# Patient Record
Sex: FEMALE | Race: WHITE | Employment: FULL TIME | URBAN - METROPOLITAN AREA
[De-identification: names, ages, dates, MRNs, and addresses within clinical notes are randomized per-mention and may not be internally consistent; named-entity substitution may affect disease eponyms.]

---

## 2017-01-03 NOTE — TELEPHONE ENCOUNTER
----- Message from Lisandra Dasilva sent at 1/3/2017  2:27 PM CST -----  Contact: self  Needs refill on prenatal prescription.  Please call back at       [id number] [place] - [address] AT [facility] [address]  [address] [place] [address]  Phone: [phone] Fax: [phone]

## 2017-01-10 ENCOUNTER — PATIENT MESSAGE (OUTPATIENT)
Dept: OBSTETRICS AND GYNECOLOGY | Facility: CLINIC | Age: 27
End: 2017-01-10

## 2017-01-17 ENCOUNTER — ROUTINE PRENATAL (OUTPATIENT)
Dept: OBSTETRICS AND GYNECOLOGY | Facility: CLINIC | Age: 27
End: 2017-01-17
Payer: COMMERCIAL

## 2017-01-17 VITALS
BODY MASS INDEX: 40.61 KG/M2 | DIASTOLIC BLOOD PRESSURE: 70 MMHG | WEIGHT: 247.81 LBS | SYSTOLIC BLOOD PRESSURE: 100 MMHG

## 2017-01-17 DIAGNOSIS — Z3A.30 30 WEEKS GESTATION OF PREGNANCY: Primary | ICD-10-CM

## 2017-01-17 DIAGNOSIS — A69.20 LYME DISEASE: ICD-10-CM

## 2017-01-17 LAB
BILIRUB SERPL-MCNC: NORMAL MG/DL
BLOOD URINE, POC: NORMAL
COLOR, POC UA: NORMAL
GLUCOSE UR QL STRIP: NORMAL
KETONES UR QL STRIP: NORMAL
LEUKOCYTE ESTERASE URINE, POC: NORMAL
NITRITE, POC UA: NORMAL
PH, POC UA: 7
PROTEIN, POC: NORMAL
SPECIFIC GRAVITY, POC UA: NORMAL
UROBILINOGEN, POC UA: NORMAL

## 2017-01-17 PROCEDURE — 99999 PR PBB SHADOW E&M-EST. PATIENT-LVL III: CPT | Mod: PBBFAC,,, | Performed by: OBSTETRICS & GYNECOLOGY

## 2017-01-17 PROCEDURE — 0502F SUBSEQUENT PRENATAL CARE: CPT | Mod: S$GLB,,, | Performed by: OBSTETRICS & GYNECOLOGY

## 2017-01-17 NOTE — MR AVS SNAPSHOT
[facility] - OB/GYN  [address] [place] [address]  Phone: [phone]                  Arlette Capps   2017 4:20 PM   Routine Prenatal    Description:  Female : 1990   Provider:  Jack Ramos MD   Department:  [facility] - OB/GYN           Reason for Visit     Routine Prenatal Visit     sharp pains in abdomen     swelling in groin           Diagnoses this Visit        Comments    30 weeks gestation of pregnancy    -  Primary            To Do List           Future Appointments        Provider Department Dept Phone    2017 4:00 PM [facility] US1 [facility] - Ultrasound [phone]      Goals (5 Years of Data)     None      [facility]     [facility] -  Assistance  Registered nurses in the [facility] provide clinical advisement, health education, appointment booking, and other advisory services.  Call for this free service at [phone].             Medications           Message regarding Medications     Verify the changes and/or additions to your medication regime listed below are the same as discussed with your clinician today.  If any of these changes or additions are incorrect, please notify your healthcare provider.             Verify that the below list of medications is an accurate representation of the medications you are currently taking.  If none reported, the list may be blank. If incorrect, please contact your healthcare provider. Carry this list with you in case of emergency.           Current Medications     -iron-FA-om-3s-dha-epa 3.33 mg iron- 0.33 mg Chew Take 1 tablet by mouth once daily.    ranitidine (ZANTAC) 150 MG tablet Take 1 tablet (150 mg total) by mouth 2 (two) times daily.           Clinical Reference Information           Prenatal Vitals     Enc. Date GA Prenatal Vitals Prenatal Pulse Pain Level Urine Albumin/Glucose Edema Presentation Dilation/Effacement/Station    17 30w3d 100/70 / 112.4  "kg (247 lb 12.8 oz) 30 cm / 145 / Present  7 Negative / Negative       12/28/16 27w4d 102/60 / 108.5 kg (239 lb 3.2 oz) 29 cm / 144 / Present 81 0 Negative / Negative       12/5/16 24w2d 124/72 / 107.5 kg (236 lb 15.9 oz) 26 cm / 136 / Present  5 Negative       12/1/16 23w5d 132/58 (A) / 107 kg (236 lb)  80         11/2/16 19w4d 126/76 / 104.6 kg (230 lb 9.6 oz) 23 cm / 158 / Present  2        10/20/16 17w5d 109/60 / 103 kg (227 lb)  94         9/29/16 14w5d 118/62 / 101.2 kg (223 lb 1.7 oz) 15 cm / 156  0 Negative / Negative       9/1/16 10w5d 100/70 / 101.2 kg (223 lb 1.7 oz)  / 152  0 Negative / Negative       8/11/16 7w5d  / 102.5 kg (226 lb)              Height: 5' 5.5" (1.664 m)       Vital Signs - Last Recorded  Most recent update: 1/17/2017  4:27 PM by Renata Valdez LPN    BP Wt LMP BMI       100/70 112.4 kg (247 lb 12.8 oz) 06/18/2016 40.61 kg/m2       Allergies as of 1/17/2017     Latex, Natural Rubber    Protonix [Pantoprazole]      Immunizations Administered on Date of Encounter - 1/17/2017     None      Orders Placed During Today's Visit      Normal Orders This Visit    POCT URINE DIPSTICK WITHOUT MICROSCOPE          1/17/2017  4:22 PM - eRnata Valdez LPN      Component Results     Component    Color, UA    Spec Grav, UA    pH, UA    7    WBC, UA    neg    Nitrite, UA    neg    Protein, UA    neg    Glucose, UA    neg    Ketones, UA    trace    Urobilinogen, UA    neg    Bilirubin, UA    neg    Blood, UA    neg      "

## 2017-01-18 ENCOUNTER — PATIENT MESSAGE (OUTPATIENT)
Dept: OBSTETRICS AND GYNECOLOGY | Facility: CLINIC | Age: 27
End: 2017-01-18

## 2017-01-18 NOTE — PROGRESS NOTES
Complaints today: none, good fetal movmement    Visit Vitals    /70    Wt 112.4 kg (247 lb 12.8 oz)    LMP 2016    BMI 40.61 kg/m2       26 y.o., at 30w4d by Estimated Date of Delivery: 3/25/17  Patient Active Problem List   Diagnosis    Left knee pain    Bilateral hand numbness    Chronic fatigue fibromyalgia syndrome    Lyme disease, unspecified    Obesity complicating pregnancy in second trimester     OB History    Para Term  AB SAB TAB Ectopic Multiple Living   1               # Outcome Date GA Lbr Jeffry/2nd Weight Sex Delivery Anes PTL Lv   1 Current                   Dating reviewed    Allergies and problem list reviewed and updated    Medical and surgical history reviewed    Prenatal labs reviewed and updated    Physical Exam:  ABD: soft, gravid, nontender,     Assessment:  30 weeks, doing well    Plan:   U/s for groath   follow up 2 Weeks, kick counts, labor precautions

## 2017-01-25 ENCOUNTER — PATIENT MESSAGE (OUTPATIENT)
Dept: OBSTETRICS AND GYNECOLOGY | Facility: CLINIC | Age: 27
End: 2017-01-25

## 2017-01-26 ENCOUNTER — ROUTINE PRENATAL (OUTPATIENT)
Dept: OBSTETRICS AND GYNECOLOGY | Facility: CLINIC | Age: 27
End: 2017-01-26
Payer: COMMERCIAL

## 2017-01-26 VITALS — BODY MASS INDEX: 41.4 KG/M2 | DIASTOLIC BLOOD PRESSURE: 76 MMHG | SYSTOLIC BLOOD PRESSURE: 124 MMHG | WEIGHT: 252.63 LBS

## 2017-01-26 DIAGNOSIS — Z3A.31 31 WEEKS GESTATION OF PREGNANCY: Primary | ICD-10-CM

## 2017-01-26 LAB
BILIRUB SERPL-MCNC: NORMAL MG/DL
BLOOD URINE, POC: NORMAL
COLOR, POC UA: NORMAL
GLUCOSE UR QL STRIP: NORMAL
KETONES UR QL STRIP: NORMAL
LEUKOCYTE ESTERASE URINE, POC: NORMAL
NITRITE, POC UA: NORMAL
PH, POC UA: 5
PROTEIN, POC: NORMAL
SPECIFIC GRAVITY, POC UA: NORMAL
UROBILINOGEN, POC UA: NORMAL

## 2017-01-26 PROCEDURE — 0502F SUBSEQUENT PRENATAL CARE: CPT | Mod: S$GLB,,, | Performed by: OBSTETRICS & GYNECOLOGY

## 2017-01-26 PROCEDURE — 1159F MED LIST DOCD IN RCRD: CPT | Mod: S$GLB,,, | Performed by: OBSTETRICS & GYNECOLOGY

## 2017-01-26 PROCEDURE — 99999 PR PBB SHADOW E&M-EST. PATIENT-LVL II: CPT | Mod: PBBFAC,,, | Performed by: OBSTETRICS & GYNECOLOGY

## 2017-01-26 NOTE — PROGRESS NOTES
Complaints today: watery vaginal d/c without bleeding x 3 weeks on and off    Visit Vitals    /76    Wt 114.6 kg (252 lb 10.4 oz)    LMP 2016    BMI 41.4 kg/m2       26 y.o., at 31w5d by Estimated Date of Delivery: 3/25/17  Patient Active Problem List   Diagnosis    Left knee pain    Bilateral hand numbness    Chronic fatigue fibromyalgia syndrome    Lyme disease, unspecified    Obesity complicating pregnancy in second trimester     OB History    Para Term  AB SAB TAB Ectopic Multiple Living   1               # Outcome Date GA Lbr Jeffry/2nd Weight Sex Delivery Anes PTL Lv   1 Current                   Dating reviewed    Allergies and problem list reviewed and updated    Medical and surgical history reviewed    Prenatal labs reviewed and updated    Physical Exam:  ABD: soft, gravid, nontender  SSE: neg pool and NTZ    Assessment:  31 weeks, doing well    Plan:   R/o ROM today   follow up 1 Weeks, kick counts, labor precautions, as scheduled.

## 2017-01-26 NOTE — MR AVS SNAPSHOT
[facility]  [address] [place] [address]  Phone: [phone]                  Arlette Capps   2017 11:40 AM   Routine Prenatal    Description:  Female : 1990   Provider:  Jack Ramos MD   Department:  [facility] - OB/GYN           Reason for Visit     Routine Prenatal Visit     c/o leaking fluid           Diagnoses this Visit        Comments    31 weeks gestation of pregnancy    -  Primary            To Do List           Future Appointments        Provider Department Dept Phone    2017 11:40 AM MD STEVIE Redd [facility] [phone]    2017 3:20 PM Jack Ramos MD [facility] [phone]    2017 4:00 PM [facility] US1 [facility] - Ultrasound [phone]      Goals (5 Years of Data)     None      [facility]     [facility] On Call Nurse Care Line -  Assistance  Registered nurses in the [facility] On Call Center provide clinical advisement, health education, appointment booking, and other advisory services.  Call for this free service at [phone].             Medications           Message regarding Medications     Verify the changes and/or additions to your medication regime listed below are the same as discussed with your clinician today.  If any of these changes or additions are incorrect, please notify your healthcare provider.             Verify that the below list of medications is an accurate representation of the medications you are currently taking.  If none reported, the list may be blank. If incorrect, please contact your healthcare provider. Carry this list with you in case of emergency.           Current Medications     -iron-FA-om-3s-dha-epa 3.33 mg iron- 0.33 mg Chew Take 1 tablet by mouth once daily.    ranitidine (ZANTAC) 150 MG tablet Take 1 tablet (150 mg total) by mouth 2 (two) times daily.           Clinical Reference Information           Prenatal Vitals     Enc. Date GA Prenatal Vitals  "Prenatal Pulse Pain Level Urine Albumin/Glucose Edema Presentation Dilation/Effacement/Station    1/26/17 31w5d 124/76 / 114.6 kg (252 lb 10.4 oz) 32 cm / 149 / Present  7 Negative / Negative       1/17/17 30w3d 100/70 / 112.4 kg (247 lb 12.8 oz) 30 cm / 145 / Present  7 Negative / Negative       12/28/16 27w4d 102/60 / 108.5 kg (239 lb 3.2 oz) 29 cm / 144 / Present 81 0 Negative / Negative       12/5/16 24w2d 124/72 / 107.5 kg (236 lb 15.9 oz) 26 cm / 136 / Present  5 Negative       12/1/16 23w5d 132/58 (A) / 107 kg (236 lb)  80         11/2/16 19w4d 126/76 / 104.6 kg (230 lb 9.6 oz) 23 cm / 158 / Present  2        10/20/16 17w5d 109/60 / 103 kg (227 lb)  94         9/29/16 14w5d 118/62 / 101.2 kg (223 lb 1.7 oz) 15 cm / 156  0 Negative / Negative       9/1/16 10w5d 100/70 / 101.2 kg (223 lb 1.7 oz)  / 152  0 Negative / Negative       8/11/16 7w5d  / 102.5 kg (226 lb)              Height: 5' 5.5" (1.664 m)       Vital Signs - Last Recorded  Most recent update: 1/26/2017 10:57 AM by Rachelle Santiago LPN    BP Wt LMP BMI       124/76 114.6 kg (252 lb 10.4 oz) 06/18/2016 41.4 kg/m2       Allergies as of 1/26/2017     Latex, Natural Rubber    Protonix [Pantoprazole]      Immunizations Administered on Date of Encounter - 1/26/2017     None      Orders Placed During Today's Visit      Normal Orders This Visit    POCT URINE DIPSTICK WITHOUT MICROSCOPE          1/26/2017 10:44 AM - Renata Valdez LPN      Component Results     Component    Color, UA    Spec Grav, UA    pH, UA    5    WBC, UA    neg    Nitrite, UA    neg    Protein, UA    neg    Glucose, UA    neg    Ketones, UA    neg    Urobilinogen, UA    neg    Bilirubin, UA    neg    Blood, UA    neg      "

## 2017-02-01 ENCOUNTER — ROUTINE PRENATAL (OUTPATIENT)
Dept: OBSTETRICS AND GYNECOLOGY | Facility: CLINIC | Age: 27
End: 2017-02-01
Payer: COMMERCIAL

## 2017-02-01 ENCOUNTER — HOSPITAL ENCOUNTER (OUTPATIENT)
Dept: RADIOLOGY | Facility: CLINIC | Age: 27
Discharge: HOME | End: 2017-02-01
Attending: OBSTETRICS & GYNECOLOGY
Payer: COMMERCIAL

## 2017-02-01 VITALS
DIASTOLIC BLOOD PRESSURE: 74 MMHG | SYSTOLIC BLOOD PRESSURE: 124 MMHG | WEIGHT: 253.06 LBS | BODY MASS INDEX: 41.48 KG/M2

## 2017-02-01 DIAGNOSIS — A69.20 LYME DISEASE: ICD-10-CM

## 2017-02-01 DIAGNOSIS — Z3A.32 32 WEEKS GESTATION OF PREGNANCY: Primary | ICD-10-CM

## 2017-02-01 DIAGNOSIS — Z3A.27 27 WEEKS GESTATION OF PREGNANCY: ICD-10-CM

## 2017-02-01 PROCEDURE — 76805 OB US >/= 14 WKS SNGL FETUS: CPT | Mod: 26,,, | Performed by: RADIOLOGY

## 2017-02-01 PROCEDURE — 0502F SUBSEQUENT PRENATAL CARE: CPT | Mod: S$GLB,,, | Performed by: OBSTETRICS & GYNECOLOGY

## 2017-02-01 PROCEDURE — 76805 OB US >/= 14 WKS SNGL FETUS: CPT | Mod: TC,PO

## 2017-02-01 PROCEDURE — 99999 PR PBB SHADOW E&M-EST. PATIENT-LVL II: CPT | Mod: PBBFAC,,, | Performed by: OBSTETRICS & GYNECOLOGY

## 2017-02-01 NOTE — MR AVS SNAPSHOT
[facility] - OB/GYN  [address] [place] [address]  Phone: [phone]                  Arlette Capps   2017 3:20 PM   Routine Prenatal    Description:  Female : 1990   Provider:  Jack Ramos MD   Department:  [facility] - OB/GYN           Reason for Visit     Routine Prenatal Visit                To Do List           Future Appointments        Provider Department Dept Phone    2017  4:00 PM [facility] US1 [facility] - Ultrasound [phone]      Goals (5 Years of Data)     None      [facility]     [facility] Care [facility] -  Assistance  Registered nurses in the [facility] provide clinical advisement, health education, appointment booking, and other advisory services.  Call for this free service at [phone].             Medications           Message regarding Medications     Verify the changes and/or additions to your medication regime listed below are the same as discussed with your clinician today.  If any of these changes or additions are incorrect, please notify your healthcare provider.             Verify that the below list of medications is an accurate representation of the medications you are currently taking.  If none reported, the list may be blank. If incorrect, please contact your healthcare provider. Carry this list with you in case of emergency.           Current Medications     -iron-FA-om-3s-dha-epa 3.33 mg iron- 0.33 mg Chew Take 1 tablet by mouth once daily.    ranitidine (ZANTAC) 150 MG tablet Take 1 tablet (150 mg total) by mouth 2 (two) times daily.           Clinical Reference Information           Prenatal Vitals     Enc. Date GA Prenatal Vitals Prenatal Pulse Pain Level Urine Albumin/Glucose Edema Presentation Dilation/Effacement/Station    17 32w4d 124/74 / 114.8 kg (253 lb 1.4 oz) 35 cm / 142 / Present  2        17 31w5d 124/76 / 114.6 kg (252 lb 10.4 oz) 32 cm / 149 / Present  7 Negative /  "Negative       17 30w3d 100/70 / 112.4 kg (247 lb 12.8 oz) 30 cm / 145 / Present  7 Negative / Negative       16 27w4d 102/60 / 108.5 kg (239 lb 3.2 oz) 29 cm / 144 / Present 81 0 Negative / Negative       16 24w2d 124/72 / 107.5 kg (236 lb 15.9 oz) 26 cm / 136 / Present  5 Negative       16 23w5d 132/58 (A) / 107 kg (236 lb)  80         16 19w4d 126/76 / 104.6 kg (230 lb 9.6 oz) 23 cm / 158 / Present  2        10/20/16 17w5d 109/60 / 103 kg (227 lb)  94         16 14w5d 118/62 / 101.2 kg (223 lb 1.7 oz) 15 cm / 156  0 Negative / Negative       16 10w5d 100/70 / 101.2 kg (223 lb 1.7 oz)  / 152  0 Negative / Negative       16 7w5d  / 102.5 kg (226 lb)              TW.8 kg (28 lb 3.5 oz)   Pregravid weight: 102 kg (224 lb 13.9 oz)   Height: 5' 5.5" (1.664 m)   BMI: 36.8       Vital Signs - Last Recorded  Most recent update: 2017  3:42 PM by Rachelle Santiago LPN    BP Wt LMP BMI       124/74 114.8 kg (253 lb 1.4 oz) 2016 41.48 kg/m2       Allergies as of 2017     Latex, Natural Rubber    Protonix [Pantoprazole]      Immunizations Administered on Date of Encounter - 2017     None      "

## 2017-02-01 NOTE — PROGRESS NOTES
Complaints today: none    Visit Vitals    /74    Wt 114.8 kg (253 lb 1.4 oz)    LMP 2016    BMI 41.48 kg/m2       26 y.o., at 32w4d by Estimated Date of Delivery: 3/25/17  Patient Active Problem List   Diagnosis    Left knee pain    Bilateral hand numbness    Chronic fatigue fibromyalgia syndrome    Lyme disease, unspecified    Obesity complicating pregnancy in second trimester     OB History    Para Term  AB SAB TAB Ectopic Multiple Living   1               # Outcome Date GA Lbr Jeffry/2nd Weight Sex Delivery Anes PTL Lv   1 Current                   Dating reviewed    Allergies and problem list reviewed and updated    Medical and surgical history reviewed    Prenatal labs reviewed and updated    Physical Exam:  ABD: soft, gravid, nontender    Assessment:  32 weeks, doing well    Plan:    follow up 2 Weeks, kick counts, labor precautions   U/s today for growth

## 2017-02-14 ENCOUNTER — ROUTINE PRENATAL (OUTPATIENT)
Dept: OBSTETRICS AND GYNECOLOGY | Facility: CLINIC | Age: 27
End: 2017-02-14
Payer: COMMERCIAL

## 2017-02-14 VITALS
WEIGHT: 259.69 LBS | DIASTOLIC BLOOD PRESSURE: 70 MMHG | BODY MASS INDEX: 42.56 KG/M2 | SYSTOLIC BLOOD PRESSURE: 119 MMHG

## 2017-02-14 DIAGNOSIS — O09.93 HIGH-RISK PREGNANCY, THIRD TRIMESTER: ICD-10-CM

## 2017-02-14 DIAGNOSIS — Z3A.34 34 WEEKS GESTATION OF PREGNANCY: Primary | ICD-10-CM

## 2017-02-14 LAB
BILIRUB SERPL-MCNC: NORMAL MG/DL
BLOOD URINE, POC: NORMAL
COLOR, POC UA: NORMAL
GLUCOSE UR QL STRIP: NORMAL
KETONES UR QL STRIP: NORMAL
LEUKOCYTE ESTERASE URINE, POC: NORMAL
NITRITE, POC UA: NORMAL
PH, POC UA: 6
PROTEIN, POC: NORMAL
SPECIFIC GRAVITY, POC UA: NORMAL
UROBILINOGEN, POC UA: NORMAL

## 2017-02-14 PROCEDURE — 0502F SUBSEQUENT PRENATAL CARE: CPT | Mod: S$GLB,,, | Performed by: OBSTETRICS & GYNECOLOGY

## 2017-02-14 PROCEDURE — 99999 PR PBB SHADOW E&M-EST. PATIENT-LVL II: CPT | Mod: PBBFAC,,, | Performed by: OBSTETRICS & GYNECOLOGY

## 2017-02-14 NOTE — PROGRESS NOTES
Complaints today: none    Visit Vitals    /70    Wt 117.8 kg (259 lb 11.2 oz)    LMP 2016    BMI 42.56 kg/m2       26 y.o., at 34w3d by Estimated Date of Delivery: 3/25/17  Patient Active Problem List   Diagnosis    Left knee pain    Bilateral hand numbness    Chronic fatigue fibromyalgia syndrome    Lyme disease, unspecified    Obesity complicating pregnancy in second trimester     OB History    Para Term  AB SAB TAB Ectopic Multiple Living   1               # Outcome Date GA Lbr Jeffry/2nd Weight Sex Delivery Anes PTL Lv   1 Current                   Dating reviewed    Allergies and problem list reviewed and updated    Medical and surgical history reviewed    Prenatal labs reviewed and updated    Physical Exam:  ABD: soft, gravid, nontender    Assessment:  34 weeks, doing well    Plan:    follow up 1 Weeks, kick counts, labor precautions    gbs at follow up , S>D

## 2017-02-14 NOTE — MR AVS SNAPSHOT
[facility] - OB/GYN  [address] [place] [address]  Phone: [phone]                  Arlette Capps   2017 3:00 PM   Routine Prenatal    Description:  Female : 1990   Provider:  Jack Ramos MD   Department:  [facility] - OB/GYN           Reason for Visit     Routine Prenatal Visit           Diagnoses this Visit        Comments    34 weeks gestation of pregnancy    -  Primary            To Do List           Goals (5 Years of Data)     None      [facility]     [facility] Care [facility] -  Assistance  Registered nurses in the [facility] On Call Center provide clinical advisement, health education, appointment booking, and other advisory services.  Call for this free service at [phone].             Medications           Message regarding Medications     Verify the changes and/or additions to your medication regime listed below are the same as discussed with your clinician today.  If any of these changes or additions are incorrect, please notify your healthcare provider.             Verify that the below list of medications is an accurate representation of the medications you are currently taking.  If none reported, the list may be blank. If incorrect, please contact your healthcare provider. Carry this list with you in case of emergency.           Current Medications     -iron-FA-om-3s-dha-epa 3.33 mg iron- 0.33 mg Chew Take 1 tablet by mouth once daily.    ranitidine (ZANTAC) 150 MG tablet Take 1 tablet (150 mg total) by mouth 2 (two) times daily.           Clinical Reference Information           Prenatal Vitals     Enc. Date GA Prenatal Vitals Prenatal Pulse Pain Level Urine Albumin/Glucose Edema Presentation Dilation/Effacement/Station    17 34w3d 119/70 / 117.8 kg (259 lb 11.2 oz) 34 cm / 178 / Present  4 Negative / 1+       17 32w4d 124/74 / 114.8 kg (253 lb 1.4 oz) 32 cm / 142 / Present  2        17 31w5d 124/76 / 114.6 kg  "(252 lb 10.4 oz) 32 cm / 149 / Present  7 Negative / Negative       1/17/17 30w3d 100/70 / 112.4 kg (247 lb 12.8 oz) 30 cm / 145 / Present  7 Negative / Negative       12/28/16 27w4d 102/60 / 108.5 kg (239 lb 3.2 oz) 29 cm / 144 / Present 81 0 Negative / Negative       12/5/16 24w2d 124/72 / 107.5 kg (236 lb 15.9 oz) 26 cm / 136 / Present  5 Negative       12/1/16 23w5d 132/58 (A) / 107 kg (236 lb)  80         11/2/16 19w4d 126/76 / 104.6 kg (230 lb 9.6 oz) 23 cm / 158 / Present  2        10/20/16 17w5d 109/60 / 103 kg (227 lb)  94         9/29/16 14w5d 118/62 / 101.2 kg (223 lb 1.7 oz) 15 cm / 156  0 Negative / Negative       9/1/16 10w5d 100/70 / 101.2 kg (223 lb 1.7 oz)  / 152  0 Negative / Negative       8/11/16 7w5d  / 102.5 kg (226 lb)              TWG: 15.8 kg (34 lb 13.3 oz)   Pregravid weight: 102 kg (224 lb 13.9 oz)   Height: 5' 5.5" (1.664 m)   BMI: 36.8       Your Vitals Were     BP Weight Last Period BMI       119/70 117.8 kg (259 lb 11.2 oz) 06/18/2016 42.56 kg/m2       Allergies as of 2/14/2017     Latex, Natural Rubber    Protonix [Pantoprazole]      Immunizations Administered on Date of Encounter - 2/14/2017     None      Orders Placed During Today's Visit      Normal Orders This Visit    POCT URINE DIPSTICK WITHOUT MICROSCOPE          2/14/2017  3:23 PM - Renaat Valdez LPN      Component Results     Component    Color, UA    Spec Grav, UA    pH, UA    6    WBC, UA    neg    Nitrite, UA    neg    Protein, UA    neg    Glucose, UA    1+    Ketones, UA    trace    Urobilinogen, UA    neg    Bilirubin, UA    neg    Blood, UA    neg            Language Assistance Services     ATTENTION: Language assistance services are available, free of charge. Please call [phone].      ATENCIÓN: Si habla español, tiene a henry disposición servicios gratuitos de asistencia lingüística. Llame al [phone].     CHÚ Ý: N?u b?n nói Ti?ng Vi?t, có các d?ch v? h? tr? ngôn ng? mi?n phí dành cho b?n. G?i s? " [phone].         [facility] complies with applicable Federal civil rights laws and does not discriminate on the basis of race, color, national origin, age, disability, or sex.

## 2017-02-21 ENCOUNTER — ROUTINE PRENATAL (OUTPATIENT)
Dept: OBSTETRICS AND GYNECOLOGY | Facility: CLINIC | Age: 27
End: 2017-02-21
Payer: COMMERCIAL

## 2017-02-21 ENCOUNTER — HOSPITAL ENCOUNTER (OUTPATIENT)
Dept: RADIOLOGY | Facility: CLINIC | Age: 27
Discharge: HOME | End: 2017-02-21
Attending: OBSTETRICS & GYNECOLOGY
Payer: COMMERCIAL

## 2017-02-21 VITALS
WEIGHT: 263.88 LBS | DIASTOLIC BLOOD PRESSURE: 84 MMHG | BODY MASS INDEX: 43.25 KG/M2 | SYSTOLIC BLOOD PRESSURE: 124 MMHG

## 2017-02-21 DIAGNOSIS — Z3A.35 35 WEEKS GESTATION OF PREGNANCY: ICD-10-CM

## 2017-02-21 DIAGNOSIS — Z3A.34 34 WEEKS GESTATION OF PREGNANCY: ICD-10-CM

## 2017-02-21 DIAGNOSIS — Z36.85 SCREENING, ANTENATAL, FOR STREPTOCOCCUS B: Primary | ICD-10-CM

## 2017-02-21 DIAGNOSIS — O09.93 HIGH-RISK PREGNANCY, THIRD TRIMESTER: ICD-10-CM

## 2017-02-21 LAB
BILIRUB SERPL-MCNC: NEGATIVE MG/DL
BLOOD URINE, POC: NEGATIVE
COLOR, POC UA: YELLOW
GLUCOSE UR QL STRIP: NEGATIVE
KETONES UR QL STRIP: NEGATIVE
LEUKOCYTE ESTERASE URINE, POC: NEGATIVE
NITRITE, POC UA: NEGATIVE
PH, POC UA: 5
PROTEIN, POC: NEGATIVE
SPECIFIC GRAVITY, POC UA: NORMAL
UROBILINOGEN, POC UA: NEGATIVE

## 2017-02-21 PROCEDURE — 1160F RVW MEDS BY RX/DR IN RCRD: CPT | Mod: S$GLB,,, | Performed by: OBSTETRICS & GYNECOLOGY

## 2017-02-21 PROCEDURE — 87081 CULTURE SCREEN ONLY: CPT

## 2017-02-21 PROCEDURE — 0502F SUBSEQUENT PRENATAL CARE: CPT | Mod: S$GLB,,, | Performed by: OBSTETRICS & GYNECOLOGY

## 2017-02-21 PROCEDURE — 99999 PR PBB SHADOW E&M-EST. PATIENT-LVL III: CPT | Mod: PBBFAC,,, | Performed by: OBSTETRICS & GYNECOLOGY

## 2017-02-21 PROCEDURE — 76816 OB US FOLLOW-UP PER FETUS: CPT | Mod: 26,,, | Performed by: RADIOLOGY

## 2017-02-21 RX ORDER — CALCIUM CARBONATE 400(1000)
2 TABLET,CHEWABLE ORAL
COMMUNITY
End: 2018-07-17 | Stop reason: ALTCHOICE

## 2017-02-21 NOTE — MR AVS SNAPSHOT
[facility] - [facility]  [address] [place] [address]  Phone: [phone]                  Arlette Capps   2017 3:40 PM   Routine Prenatal    Description:  Female : 1990   Provider:  Jack Ramos MD   Department:  [facility] - OB/GYN           Reason for Visit     Routine Prenatal Visit           Diagnoses this Visit        Comments    Screening, , for Streptococcus B    -  Primary     35 weeks gestation of pregnancy                To Do List           Goals (5 Years of Data)     None      [facility]     [facility] Nurse Care Line -  Assistance  Registered nurses in the [facility] On Call Center provide clinical advisement, health education, appointment booking, and other advisory services.  Call for this free service at [phone].             Medications           Message regarding Medications     Verify the changes and/or additions to your medication regime listed below are the same as discussed with your clinician today.  If any of these changes or additions are incorrect, please notify your healthcare provider.             Verify that the below list of medications is an accurate representation of the medications you are currently taking.  If none reported, the list may be blank. If incorrect, please contact your healthcare provider. Carry this list with you in case of emergency.           Current Medications     calcium carbonate 400 mg (1,000 mg) Chew Take 2 tablets by mouth every 2 (two) hours as needed.    CALCIUM/MAGNESIUM (CALCIUM AND MAGNESIUM ORAL) Take 2 tablets by mouth 3 (three) times a week.    -iron-FA-om-3s-dha-epa 3.33 mg iron- 0.33 mg Chew Take 1 tablet by mouth once daily.    ranitidine (ZANTAC) 150 MG tablet Take 1 tablet (150 mg total) by mouth 2 (two) times daily.           Clinical Reference Information           Prenatal Vitals     Enc. Date GA Prenatal Vitals Prenatal Pulse Pain Level Urine Albumin/Glucose Edema  "Presentation Dilation/Effacement/Station    17 35w3d 124/84 / 119.7 kg (263 lb 14.3 oz) 38 cm / 143 / Present  0 Negative / Negative       17 34w3d 119/70 / 117.8 kg (259 lb 11.2 oz) 37 cm / 178 / Present  4 Negative / 1+       17 32w4d 124/74 / 114.8 kg (253 lb 1.4 oz) 32 cm / 142 / Present  2        17 31w5d 124/76 / 114.6 kg (252 lb 10.4 oz) 32 cm / 149 / Present  7 Negative / Negative       17 30w3d 100/70 / 112.4 kg (247 lb 12.8 oz) 30 cm / 145 / Present  7 Negative / Negative       16 27w4d 102/60 / 108.5 kg (239 lb 3.2 oz) 29 cm / 144 / Present 81 0 Negative / Negative       16 24w2d 124/72 / 107.5 kg (236 lb 15.9 oz) 26 cm / 136 / Present  5 Negative       16 23w5d 132/58 (A) / 107 kg (236 lb)  80         16 19w4d 126/76 / 104.6 kg (230 lb 9.6 oz) 23 cm / 158 / Present  2        10/20/16 17w5d 109/60 / 103 kg (227 lb)  94         16 14w5d 118/62 / 101.2 kg (223 lb 1.7 oz) 15 cm / 156  0 Negative / Negative       16 10w5d 100/70 / 101.2 kg (223 lb 1.7 oz)  / 152  0 Negative / Negative       16 7w5d  / 102.5 kg (226 lb)              TW.7 kg (39 lb 0.3 oz)   Pregravid weight: 102 kg (224 lb 13.9 oz)   Height: 5' 5.5" (1.664 m)   BMI: 36.8       Your Vitals Were     BP Weight Last Period BMI       124/84 119.7 kg (263 lb 14.3 oz) 2016 43.25 kg/m2       Allergies as of 2017     Latex, Natural Rubber    Protonix [Pantoprazole]      Immunizations Administered on Date of Encounter - 2017     None      Orders Placed During Today's Visit      Normal Orders This Visit    POCT URINE DIPSTICK WITHOUT MICROSCOPE     Strep B Screen, Vaginal / Rectal          2017  3:50 PM - Day Lubin LPN      Component Results     Component    Color    Yellow    Spec Grav    pH, UA    5    WBC, UA    Negative    Nitrite    Negative    Protein    Negative    Glucose, UA    Negative    Ketones, UA    Negative    Urobilinogen    Negative    " Bilirubin    Negative    Blood, UA    Negative            Language Assistance Services     ATTENTION: Language assistance services are available, free of charge. Please call [phone].      ATENCIÓN: Si habla diamondañol, tiene a henry disposición servicios gratuitos de asistencia lingüística. Llame al [phone].     CHÚ Ý: N?u b?n nói Ti?ng Vi?t, có các d?ch v? h? tr? ngôn ng? mi?n phí dành cho b?n. G?i s? [phone].         [facility] complies with applicable Federal civil rights laws and does not discriminate on the basis of race, color, national origin, age, disability, or sex.

## 2017-02-23 LAB — BACTERIA SPEC AEROBE CULT: NORMAL

## 2017-03-01 ENCOUNTER — ROUTINE PRENATAL (OUTPATIENT)
Dept: OBSTETRICS AND GYNECOLOGY | Facility: CLINIC | Age: 27
End: 2017-03-01
Payer: COMMERCIAL

## 2017-03-01 VITALS — WEIGHT: 265.63 LBS | BODY MASS INDEX: 43.53 KG/M2 | SYSTOLIC BLOOD PRESSURE: 90 MMHG | DIASTOLIC BLOOD PRESSURE: 60 MMHG

## 2017-03-01 DIAGNOSIS — O99.611 GASTROESOPHAGEAL REFLUX DURING PREGNANCY IN FIRST TRIMESTER, ANTEPARTUM: ICD-10-CM

## 2017-03-01 DIAGNOSIS — Z3A.36 36 WEEKS GESTATION OF PREGNANCY: Primary | ICD-10-CM

## 2017-03-01 DIAGNOSIS — K21.9 GASTROESOPHAGEAL REFLUX DURING PREGNANCY IN FIRST TRIMESTER, ANTEPARTUM: ICD-10-CM

## 2017-03-01 PROCEDURE — 99999 PR PBB SHADOW E&M-EST. PATIENT-LVL II: CPT | Mod: PBBFAC,,, | Performed by: OBSTETRICS & GYNECOLOGY

## 2017-03-01 PROCEDURE — 0502F SUBSEQUENT PRENATAL CARE: CPT | Mod: S$GLB,,, | Performed by: OBSTETRICS & GYNECOLOGY

## 2017-03-01 NOTE — MR AVS SNAPSHOT
[facility] - [facility]  [address] [place] [address]  Phone: [phone]                  Arlette Capps   3/1/2017 4:40 PM   Routine Prenatal    Description:  Female : 1990   Provider:  Jack Ramos MD   Department:  [facility] - OB/GY[facility]           Diagnoses this Visit        Comments    36 weeks gestation of pregnancy    -  Primary            To Do List           Goals (5 Years of Data)     None      [facility]     [facility] -  Assistance  Registered nurses in the [facility] provide clinical advisement, health education, appointment booking, and other advisory services.  Call for this free service at [phone].             Medications           Message regarding Medications     Verify the changes and/or additions to your medication regime listed below are the same as discussed with your clinician today.  If any of these changes or additions are incorrect, please notify your healthcare provider.             Verify that the below list of medications is an accurate representation of the medications you are currently taking.  If none reported, the list may be blank. If incorrect, please contact your healthcare provider. Carry this list with you in case of emergency.           Current Medications     calcium carbonate 400 mg (1,000 mg) Chew Take 2 tablets by mouth every 2 (two) hours as needed.    CALCIUM/MAGNESIUM (CALCIUM AND MAGNESIUM ORAL) Take 2 tablets by mouth 3 (three) times a week.    -iron-FA-om-3s-dha-epa 3.33 mg iron- 0.33 mg Chew Take 1 tablet by mouth once daily.    ranitidine (ZANTAC) 150 MG tablet TAKE 1 TABLET BY MOUTH TWICE DAILY           Clinical Reference Information           Prenatal Vitals     Enc. Date GA Prenatal Vitals Prenatal Pulse Pain Level Urine Albumin/Glucose Edema Presentation Dilation/Effacement/Station    3/1/17 36w4d 90/60 / 120.5 kg (265 lb 10.5 oz) 39 cm / 144 / Present  0 Negative /  "Negative       17 35w3d 124/84 / 119.7 kg (263 lb 14.3 oz) 38 cm / 143 / Present  0 Negative / Negative       17 34w3d 119/70 / 117.8 kg (259 lb 11.2 oz) 37 cm / 178 / Present  4 Negative / 1+       17 32w4d 124/74 / 114.8 kg (253 lb 1.4 oz) 32 cm / 142 / Present  2        17 31w5d 124/76 / 114.6 kg (252 lb 10.4 oz) 32 cm / 149 / Present  7 Negative / Negative       17 30w3d 100/70 / 112.4 kg (247 lb 12.8 oz) 30 cm / 145 / Present  7 Negative / Negative       16 27w4d 102/60 / 108.5 kg (239 lb 3.2 oz) 29 cm / 144 / Present 81 0 Negative / Negative       16 24w2d 124/72 / 107.5 kg (236 lb 15.9 oz) 26 cm / 136 / Present  5 Negative       16 23w5d 132/58 (A) / 107 kg (236 lb)  80         16 19w4d 126/76 / 104.6 kg (230 lb 9.6 oz) 23 cm / 158 / Present  2        10/20/16 17w5d 109/60 / 103 kg (227 lb)  94         16 14w5d 118/62 / 101.2 kg (223 lb 1.7 oz) 15 cm / 156  0 Negative / Negative       16 10w5d 100/70 / 101.2 kg (223 lb 1.7 oz)  / 152  0 Negative / Negative       16 7w5d  / 102.5 kg (226 lb)              TW.5 kg (40 lb 12.6 oz)   Pregravid weight: 102 kg (224 lb 13.9 oz)   Height: 5' 5.5" (1.664 m)   BMI: 36.8       Your Vitals Were     BP                   90/60           Allergies as of 3/1/2017     Latex, Natural Rubber    Protonix [Pantoprazole]      Immunizations Administered on Date of Encounter - 3/1/2017     None      Orders Placed During Today's Visit      Normal Orders This Visit    POCT URINE DIPSTICK WITHOUT MICROSCOPE          3/1/2017  4:32 PM - Renata Valdez LPN      Component Results     Component    Color    Spec Grav    pH, UA    5    WBC, UA    neg    Nitrite    neg    Protein    neg    Glucose, UA    neg    Ketones, UA    neg    Urobilinogen    neg    Bilirubin    neg    Blood, UA    neg            Language Assistance Services     ATTENTION: Language assistance services are available, free of charge. Please call " [phone].      ATENCIÓN: Si habla español, tiene a henry disposición servicios gratuitos de asistencia lingüística. Llame al [phone].     CHÚ Ý: N?u b?n nói Ti?ng Vi?t, có các d?ch v? h? tr? ngôn ng? mi?n phí dành cho b?n. G?i s? [phone].         [facility] complies with applicable Federal civil rights laws and does not discriminate on the basis of race, color, national origin, age, disability, or sex.

## 2017-03-01 NOTE — PROGRESS NOTES
Complaints today: none    BP 90/60  Wt 120.5 kg (265 lb 10.5 oz)  LMP 2016  BMI 43.53 kg/m2    26 y.o., at 36w4d by Estimated Date of Delivery: 3/25/17  Patient Active Problem List   Diagnosis    Left knee pain    Bilateral hand numbness    Chronic fatigue fibromyalgia syndrome    Lyme disease, unspecified    Obesity complicating pregnancy in second trimester     OB History    Para Term  AB SAB TAB Ectopic Multiple Living   1               # Outcome Date GA Lbr Jeffry/2nd Weight Sex Delivery Anes PTL Lv   1 Current                   Dating reviewed    Allergies and problem list reviewed and updated    Medical and surgical history reviewed    Prenatal labs reviewed and updated    Physical Exam:  ABD: soft, gravid, nontender    Assessment:  irreg contractions, counselde  36 weeks, doing well    Plan:    follow up 1 Weeks, kick counts, labor precautions

## 2017-03-06 ENCOUNTER — PATIENT MESSAGE (OUTPATIENT)
Dept: OBSTETRICS AND GYNECOLOGY | Facility: CLINIC | Age: 27
End: 2017-03-06

## 2017-03-07 ENCOUNTER — ROUTINE PRENATAL (OUTPATIENT)
Dept: OBSTETRICS AND GYNECOLOGY | Facility: CLINIC | Age: 27
End: 2017-03-07
Payer: COMMERCIAL

## 2017-03-07 VITALS
WEIGHT: 271.19 LBS | BODY MASS INDEX: 44.44 KG/M2 | SYSTOLIC BLOOD PRESSURE: 124 MMHG | DIASTOLIC BLOOD PRESSURE: 72 MMHG

## 2017-03-07 DIAGNOSIS — Z3A.37 37 WEEKS GESTATION OF PREGNANCY: Primary | ICD-10-CM

## 2017-03-07 LAB
BILIRUB SERPL-MCNC: NORMAL MG/DL
BLOOD URINE, POC: NORMAL
COLOR, POC UA: YELLOW
GLUCOSE UR QL STRIP: NORMAL
KETONES UR QL STRIP: NORMAL
LEUKOCYTE ESTERASE URINE, POC: NORMAL
NITRITE, POC UA: NORMAL
PH, POC UA: 7
PROTEIN, POC: NORMAL
SPECIFIC GRAVITY, POC UA: NORMAL
UROBILINOGEN, POC UA: NORMAL

## 2017-03-07 PROCEDURE — 0502F SUBSEQUENT PRENATAL CARE: CPT | Mod: S$GLB,,, | Performed by: OBSTETRICS & GYNECOLOGY

## 2017-03-07 PROCEDURE — 99999 PR PBB SHADOW E&M-EST. PATIENT-LVL II: CPT | Mod: PBBFAC,,, | Performed by: OBSTETRICS & GYNECOLOGY

## 2017-03-07 NOTE — PROGRESS NOTES
Complaints today: none    /72  Wt 123 kg (271 lb 2.7 oz)  LMP 2016  BMI 44.44 kg/m2    26 y.o., at 37w3d by Estimated Date of Delivery: 3/25/17  Patient Active Problem List   Diagnosis    Left knee pain    Bilateral hand numbness    Chronic fatigue fibromyalgia syndrome    Lyme disease, unspecified    Obesity complicating pregnancy in second trimester     OB History    Para Term  AB SAB TAB Ectopic Multiple Living   1               # Outcome Date GA Lbr Jeffry/2nd Weight Sex Delivery Anes PTL Lv   1 Current                   Dating reviewed    Allergies and problem list reviewed and updated    Medical and surgical history reviewed    Prenatal labs reviewed and updated    Physical Exam:  ABD: soft, gravid, nontender    Assessment:  37 weeks, doing well    Plan:    follow up 1 Weeks, kick counts, labor precautions

## 2017-03-07 NOTE — MR AVS SNAPSHOT
[facility] - OB/GYN  [address] [place] [address]  Phone: [phone]                  Arlette Capps   3/7/2017 2:20 PM   Routine Prenatal    Description:  Female : 1990   Provider:  Jack Ramos MD   Department:  [facility] - OB/GY[facility]           Reason for Visit     Routine Prenatal Visit           Diagnoses this Visit        Comments    37 weeks gestation of pregnancy    -  Primary            To Do List           Goals (5 Years of Data)     None      [facility]     [facility] Care [facility] -  Assistance  Registered nurses in the [facility] provide clinical advisement, health education, appointment booking, and other advisory services.  Call for this free service at [phone].             Medications           Message regarding Medications     Verify the changes and/or additions to your medication regime listed below are the same as discussed with your clinician today.  If any of these changes or additions are incorrect, please notify your healthcare provider.             Verify that the below list of medications is an accurate representation of the medications you are currently taking.  If none reported, the list may be blank. If incorrect, please contact your healthcare provider. Carry this list with you in case of emergency.           Current Medications     -iron-FA-om-3s-dha-epa 3.33 mg iron- 0.33 mg Chew Take 1 tablet by mouth once daily.    ranitidine (ZANTAC) 150 MG tablet TAKE 1 TABLET BY MOUTH TWICE DAILY    calcium carbonate 400 mg (1,000 mg) Chew Take 2 tablets by mouth every 2 (two) hours as needed.    CALCIUM/MAGNESIUM (CALCIUM AND MAGNESIUM ORAL) Take 2 tablets by mouth 3 (three) times a week.           Clinical Reference Information           Prenatal Vitals     Enc. Date GA Prenatal Vitals Prenatal Pulse Pain Level Urine Albumin/Glucose Edema Presentation Dilation/Effacement/Station    3/7/17 37w3d 124/72 / 123 kg (271  "lb 2.7 oz) 39 cm / 143 / Present  2 Negative / Negative       3/1/17 36w4d 90/60 / 120.5 kg (265 lb 10.5 oz) 39 cm / 144 / Present  0 Negative / Negative       17 35w3d 124/84 / 119.7 kg (263 lb 14.3 oz) 38 cm / 143 / Present  0 Negative / Negative       17 34w3d 119/70 / 117.8 kg (259 lb 11.2 oz) 37 cm / 178 / Present  4 Negative / 1+       17 32w4d 124/74 / 114.8 kg (253 lb 1.4 oz) 32 cm / 142 / Present  2        17 31w5d 124/76 / 114.6 kg (252 lb 10.4 oz) 32 cm / 149 / Present  7 Negative / Negative       17 30w3d 100/70 / 112.4 kg (247 lb 12.8 oz) 30 cm / 145 / Present  7 Negative / Negative       16 27w4d 102/60 / 108.5 kg (239 lb 3.2 oz) 29 cm / 144 / Present 81 0 Negative / Negative       16 24w2d 124/72 / 107.5 kg (236 lb 15.9 oz) 26 cm / 136 / Present  5 Negative       16 23w5d 132/58 (A) / 107 kg (236 lb)  80         16 19w4d 126/76 / 104.6 kg (230 lb 9.6 oz) 23 cm / 158 / Present  2        10/20/16 17w5d 109/60 / 103 kg (227 lb)  94         16 14w5d 118/62 / 101.2 kg (223 lb 1.7 oz) 15 cm / 156  0 Negative / Negative       16 10w5d 100/70 / 101.2 kg (223 lb 1.7 oz)  / 152  0 Negative / Negative       16 7w5d  / 102.5 kg (226 lb)              TW kg (46 lb 4.7 oz)   Pregravid weight: 102 kg (224 lb 13.9 oz)   Height: 5' 5.5" (1.664 m)   BMI: 36.8       Your Vitals Were     BP Weight Last Period BMI       124/72 123 kg (271 lb 2.7 oz) 2016 44.44 kg/m2       Allergies as of 3/7/2017     Latex, Natural Rubber    Protonix [Pantoprazole]      Immunizations Administered on Date of Encounter - 3/7/2017     None      Orders Placed During Today's Visit      Normal Orders This Visit    POCT URINE DIPSTICK WITHOUT MICROSCOPE          3/7/2017  2:36 PM - Rachelle Santiago LPN      Component Results     Component    Color    yellow    Spec Grav    pH, UA    7    WBC, UA    trace    Nitrite    neg    Protein    neg    Glucose, UA    neg    Ketones, UA "    neg    Urobilinogen    neg    Bilirubin    neg    Blood, UA    neg            Language Assistance Services     ATTENTION: Language assistance services are available, free of charge. Please call [phone].      ATENCIÓN: Si habla mat, tiene a henry disposición servicios gratuitos de asistencia lingüística. Llame al [phone].     CHÚ Ý: N?u b?n nói Ti?ng Vi?t, có các d?ch v? h? tr? ngôn ng? mi?n phí dành cho b?n. G?i s? [phone].         [facility] - [facility] complies with applicable Federal civil rights laws and does not discriminate on the basis of race, color, national origin, age, disability, or sex.

## 2017-03-14 ENCOUNTER — ROUTINE PRENATAL (OUTPATIENT)
Dept: OBSTETRICS AND GYNECOLOGY | Facility: CLINIC | Age: 27
End: 2017-03-14
Payer: COMMERCIAL

## 2017-03-14 VITALS
BODY MASS INDEX: 44.08 KG/M2 | SYSTOLIC BLOOD PRESSURE: 126 MMHG | WEIGHT: 268.94 LBS | DIASTOLIC BLOOD PRESSURE: 84 MMHG

## 2017-03-14 DIAGNOSIS — Z3A.38 38 WEEKS GESTATION OF PREGNANCY: Primary | ICD-10-CM

## 2017-03-14 LAB
BILIRUB SERPL-MCNC: NORMAL MG/DL
BLOOD URINE, POC: NORMAL
COLOR, POC UA: YELLOW
GLUCOSE UR QL STRIP: NORMAL
KETONES UR QL STRIP: NORMAL
LEUKOCYTE ESTERASE URINE, POC: NORMAL
NITRITE, POC UA: NORMAL
PH, POC UA: 6
PROTEIN, POC: NORMAL
SPECIFIC GRAVITY, POC UA: NORMAL
UROBILINOGEN, POC UA: NORMAL

## 2017-03-14 PROCEDURE — 99999 PR PBB SHADOW E&M-EST. PATIENT-LVL II: CPT | Mod: PBBFAC,,, | Performed by: OBSTETRICS & GYNECOLOGY

## 2017-03-14 PROCEDURE — 0502F SUBSEQUENT PRENATAL CARE: CPT | Mod: ,,, | Performed by: OBSTETRICS & GYNECOLOGY

## 2017-03-14 PROCEDURE — 81002 URINALYSIS NONAUTO W/O SCOPE: CPT | Mod: S$GLB,,, | Performed by: OBSTETRICS & GYNECOLOGY

## 2017-03-14 NOTE — MR AVS SNAPSHOT
[facility] - OB/GYN  [address] [place] [address]  Phone: [phone]                  Arlette Capps   3/14/2017 4:20 PM   Routine Prenatal    Description:  Female : 1990   Provider:  Jack Ramos MD   Department:  [facility] - OB/GY[facility]           Reason for Visit     Routine Prenatal Visit           Diagnoses this Visit        Comments    38 weeks gestation of pregnancy    -  Primary            To Do List           Goals (5 Years of Data)     None      [facility]     [facility] Care [facility] -  Assistance  Registered nurses in the [facility] provide clinical advisement, health education, appointment booking, and other advisory services.  Call for this free service at [phone].             Medications           Message regarding Medications     Verify the changes and/or additions to your medication regime listed below are the same as discussed with your clinician today.  If any of these changes or additions are incorrect, please notify your healthcare provider.             Verify that the below list of medications is an accurate representation of the medications you are currently taking.  If none reported, the list may be blank. If incorrect, please contact your healthcare provider. Carry this list with you in case of emergency.           Current Medications     calcium carbonate 400 mg (1,000 mg) Chew Take 2 tablets by mouth every 2 (two) hours as needed.    CALCIUM/MAGNESIUM (CALCIUM AND MAGNESIUM ORAL) Take 2 tablets by mouth 3 (three) times a week.    -iron-FA-om-3s-dha-epa 3.33 mg iron- 0.33 mg Chew Take 1 tablet by mouth once daily.    ranitidine (ZANTAC) 150 MG tablet TAKE 1 TABLET BY MOUTH TWICE DAILY           Clinical Reference Information           Prenatal Vitals     Enc. Date GA Prenatal Vitals Prenatal Pulse Pain Level Urine Albumin/Glucose Edema Presentation Dilation/Effacement/Station    3/14/17 38w3d 126/84 / 122 kg (268  "lb 15.4 oz)  / 150 / Present  0 Negative / Negative       3/7/17 37w3d 124/72 / 123 kg (271 lb 2.7 oz) 39 cm / 143 / Present  2 Negative / Negative +1 / +1 Vertex 2 / 50 / -3    3/1/17 36w4d 90/60 / 120.5 kg (265 lb 10.5 oz) 39 cm / 144 / Present  0 Negative / Negative       17 35w3d 124/84 / 119.7 kg (263 lb 14.3 oz) 38 cm / 143 / Present  0 Negative / Negative       17 34w3d 119/70 / 117.8 kg (259 lb 11.2 oz) 37 cm / 178 / Present  4 Negative / 1+       17 32w4d 124/74 / 114.8 kg (253 lb 1.4 oz) 32 cm / 142 / Present  2        17 31w5d 124/76 / 114.6 kg (252 lb 10.4 oz) 32 cm / 149 / Present  7 Negative / Negative       17 30w3d 100/70 / 112.4 kg (247 lb 12.8 oz) 30 cm / 145 / Present  7 Negative / Negative       16 27w4d 102/60 / 108.5 kg (239 lb 3.2 oz) 29 cm / 144 / Present 81 0 Negative / Negative       16 24w2d 124/72 / 107.5 kg (236 lb 15.9 oz) 26 cm / 136 / Present  5 Negative       16 23w5d 132/58 (A) / 107 kg (236 lb)  80         16 19w4d 126/76 / 104.6 kg (230 lb 9.6 oz) 23 cm / 158 / Present  2        10/20/16 17w5d 109/60 / 103 kg (227 lb)  94         16 14w5d 118/62 / 101.2 kg (223 lb 1.7 oz) 15 cm / 156  0 Negative / Negative       16 10w5d 100/70 / 101.2 kg (223 lb 1.7 oz)  / 152  0 Negative / Negative       16 7w5d  / 102.5 kg (226 lb)              TW kg (44 lb 1.5 oz)   Pregravid weight: 102 kg (224 lb 13.9 oz)   Height: 5' 5.5" (1.664 m)   BMI: 36.8       Your Vitals Were     BP Weight Last Period BMI       126/84 122 kg (268 lb 15.4 oz) 2016 44.08 kg/m2       Allergies as of 3/14/2017     Latex, Natural Rubber    Protonix [Pantoprazole]      Immunizations Administered on Date of Encounter - 3/14/2017     None      Orders Placed During Today's Visit      Normal Orders This Visit    POCT URINE DIPSTICK WITHOUT MICROSCOPE          3/14/2017  4:34 PM - Rachelle Santiago LPN      Component Results     Component    Color    yellow    " Spec Grav    pH, UA    6    WBC, UA    neg    Nitrite    neg    Protein    neg    Glucose, UA    neg    Ketones, UA    neg    Urobilinogen    neg    Bilirubin    neg    Blood, UA    neg            Language Assistance Services     ATTENTION: Language assistance services are available, free of charge. Please call [phone].      ATENCIÓN: Si habla español, tiene a henry disposición servicios gratuitos de asistencia lingüística. Llame al [phone].     CHÚ Ý: N?u b?n nói Ti?ng Vi?t, có các d?ch v? h? tr? ngôn ng? mi?n phí dành cho b?n. G?i s? [phone].         [facility] - [facility] complies with applicable Federal civil rights laws and does not discriminate on the basis of race, color, national origin, age, disability, or sex.

## 2017-03-14 NOTE — PROGRESS NOTES
Complaints today: none    /84  Wt 122 kg (268 lb 15.4 oz)  LMP 2016  BMI 44.08 kg/m2    26 y.o., at 38w3d by Estimated Date of Delivery: 3/25/17  Patient Active Problem List   Diagnosis    Left knee pain    Bilateral hand numbness    Chronic fatigue fibromyalgia syndrome    Lyme disease, unspecified    Obesity complicating pregnancy in second trimester     OB History    Para Term  AB SAB TAB Ectopic Multiple Living   1               # Outcome Date GA Lbr Jeffry/2nd Weight Sex Delivery Anes PTL Lv   1 Current                   Dating reviewed    Allergies and problem list reviewed and updated    Medical and surgical history reviewed    Prenatal labs reviewed and updated    Physical Exam:  ABD: soft, gravid, nontender    Assessment:  38 weeks, doing well    Plan:    follow up 1 Weeks, kick counts, labor precautions

## 2017-03-15 ENCOUNTER — PATIENT MESSAGE (OUTPATIENT)
Dept: OBSTETRICS AND GYNECOLOGY | Facility: CLINIC | Age: 27
End: 2017-03-15

## 2017-03-22 ENCOUNTER — ROUTINE PRENATAL (OUTPATIENT)
Dept: OBSTETRICS AND GYNECOLOGY | Facility: CLINIC | Age: 27
End: 2017-03-22
Payer: COMMERCIAL

## 2017-03-22 ENCOUNTER — PATIENT MESSAGE (OUTPATIENT)
Dept: OBSTETRICS AND GYNECOLOGY | Facility: CLINIC | Age: 27
End: 2017-03-22

## 2017-03-22 VITALS
DIASTOLIC BLOOD PRESSURE: 70 MMHG | SYSTOLIC BLOOD PRESSURE: 124 MMHG | BODY MASS INDEX: 44.15 KG/M2 | WEIGHT: 269.38 LBS

## 2017-03-22 DIAGNOSIS — Z3A.39 39 WEEKS GESTATION OF PREGNANCY: Primary | ICD-10-CM

## 2017-03-22 LAB
BILIRUB SERPL-MCNC: NORMAL MG/DL
BLOOD URINE, POC: 50
COLOR, POC UA: YELLOW
GLUCOSE UR QL STRIP: NORMAL
KETONES UR QL STRIP: NORMAL
LEUKOCYTE ESTERASE URINE, POC: NORMAL
NITRITE, POC UA: NORMAL
PH, POC UA: 5
PROTEIN, POC: NORMAL
SPECIFIC GRAVITY, POC UA: NORMAL
UROBILINOGEN, POC UA: NORMAL

## 2017-03-22 PROCEDURE — 99999 PR PBB SHADOW E&M-EST. PATIENT-LVL II: CPT | Mod: PBBFAC,,, | Performed by: OBSTETRICS & GYNECOLOGY

## 2017-03-22 PROCEDURE — 0502F SUBSEQUENT PRENATAL CARE: CPT | Mod: S$GLB,,, | Performed by: OBSTETRICS & GYNECOLOGY

## 2017-03-22 NOTE — MR AVS SNAPSHOT
[facility]  [address] [place] [address]  Phone: [phone]                  Arlette Capps   3/22/2017 4:20 PM   Routine Prenatal    Description:  Female : 1990   Provider:  Jack Ramos MD   Department:  [facility] - OB/GYN           Reason for Visit     Routine Prenatal Visit           Diagnoses this Visit        Comments    39 weeks gestation of pregnancy    -  Primary            To Do List           Future Appointments        Provider Department Dept Phone    3/22/2017 4:20 PM Jack Ramos MD [phone]      Goals (5 Years of Data)     None      [facility]     [facility] -  Assistance  Registered nurses in the [facility] Call Center provide clinical advisement, health education, appointment booking, and other advisory services.  Call for this free service at [phone].             Medications           Message regarding Medications     Verify the changes and/or additions to your medication regime listed below are the same as discussed with your clinician today.  If any of these changes or additions are incorrect, please notify your healthcare provider.             Verify that the below list of medications is an accurate representation of the medications you are currently taking.  If none reported, the list may be blank. If incorrect, please contact your healthcare provider. Carry this list with you in case of emergency.           Current Medications     calcium carbonate 400 mg (1,000 mg) Chew Take 2 tablets by mouth every 2 (two) hours as needed.    CALCIUM/MAGNESIUM (CALCIUM AND MAGNESIUM ORAL) Take 2 tablets by mouth 3 (three) times a week.    -iron-FA-om-3s-dha-epa 3.33 mg iron- 0.33 mg Chew Take 1 tablet by mouth once daily.    ranitidine (ZANTAC) 150 MG tablet TAKE 1 TABLET BY MOUTH TWICE DAILY           Clinical Reference Information           Prenatal Vitals     Enc. Date GA Prenatal  "Vitals Prenatal Pulse Pain Level Urine Albumin/Glucose Edema Presentation Dilation/Effacement/Station    3/22/17 39w4d 124/70 / 122.2 kg (269 lb 6.4 oz) 40 cm / 134 / Present  2 Negative / Negative       3/14/17 38w3d 126/84 / 122 kg (268 lb 15.4 oz) 39 cm / 150 / Present  0 Negative / Negative  Vertex 2 / 50 / -3    3/7/17 37w3d 124/72 / 123 kg (271 lb 2.7 oz) 39 cm / 143 / Present  2 Negative / Negative +1 / +1 Vertex 2 / 50 / -3    3/1/17 36w4d 90/60 / 120.5 kg (265 lb 10.5 oz) 39 cm / 144 / Present  0 Negative / Negative       17 35w3d 124/84 / 119.7 kg (263 lb 14.3 oz) 38 cm / 143 / Present  0 Negative / Negative       17 34w3d 119/70 / 117.8 kg (259 lb 11.2 oz) 37 cm / 178 / Present  4 Negative / 1+       17 32w4d 124/74 / 114.8 kg (253 lb 1.4 oz) 32 cm / 142 / Present  2        17 31w5d 124/76 / 114.6 kg (252 lb 10.4 oz) 32 cm / 149 / Present  7 Negative / Negative       17 30w3d 100/70 / 112.4 kg (247 lb 12.8 oz) 30 cm / 145 / Present  7 Negative / Negative       16 27w4d 102/60 / 108.5 kg (239 lb 3.2 oz) 29 cm / 144 / Present 81 0 Negative / Negative       16 24w2d 124/72 / 107.5 kg (236 lb 15.9 oz) 26 cm / 136 / Present  5 Negative       16 23w5d 132/58 (A) / 107 kg (236 lb)  80         16 19w4d 126/76 / 104.6 kg (230 lb 9.6 oz) 23 cm / 158 / Present  2        10/20/16 17w5d 109/60 / 103 kg (227 lb)  94         16 14w5d 118/62 / 101.2 kg (223 lb 1.7 oz) 15 cm / 156  0 Negative / Negative       16 10w5d 100/70 / 101.2 kg (223 lb 1.7 oz)  / 152  0 Negative / Negative       16 7w5d  / 102.5 kg (226 lb)              TW.2 kg (44 lb 8.5 oz)   Pregravid weight: 102 kg (224 lb 13.9 oz)   Height: 5' 5.5" (1.664 m)   BMI: 36.8       Your Vitals Were     BP Weight Last Period BMI       124/70 122.2 kg (269 lb 6.4 oz) 2016 44.15 kg/m2       Allergies as of 3/22/2017     Latex, Natural Rubber    Protonix [Pantoprazole]      Immunizations Administered " on Date of Encounter - 3/22/2017     None      Orders Placed During Today's Visit      Normal Orders This Visit    POCT URINE DIPSTICK WITHOUT MICROSCOPE          3/22/2017  9:53 AM - Rachelle Santiago LPN      Component Results     Component    Color    yellow    Spec Grav    pH, UA    5    WBC, UA    neg    Nitrite    neg    Protein    neg    Glucose, UA    neg    Ketones, UA    neg    Urobilinogen    neg    Bilirubin    neg    Blood, UA    50            Language Assistance Services     ATTENTION: Language assistance services are available, free of charge. Please call [phone].      ATENCIÓN: Si habla español, tiene a henry disposición servicios gratuitos de asistencia lingüística. Llame al [phone].     CHÚ Ý: N?u b?n nói Ti?ng Vi?t, có các d?ch v? h? tr? ngôn ng? mi?n phí dành cho b?n. G?i s? [phone].         [facility] - [facility] complies with applicable Federal civil rights laws and does not discriminate on the basis of race, color, national origin, age, disability, or sex.

## 2017-03-22 NOTE — PROGRESS NOTES
Complaints today: contractions last night    /70  Wt 122.2 kg (269 lb 6.4 oz)  LMP 2016  BMI 44.15 kg/m2    26 y.o., at 39w4d by Estimated Date of Delivery: 3/25/17  Patient Active Problem List   Diagnosis    Left knee pain    Bilateral hand numbness    Chronic fatigue fibromyalgia syndrome    Lyme disease, unspecified    Obesity complicating pregnancy in second trimester     OB History    Para Term  AB SAB TAB Ectopic Multiple Living   1               # Outcome Date GA Lbr Jeffry/2nd Weight Sex Delivery Anes PTL Lv   1 Current                   Dating reviewed    Allergies and problem list reviewed and updated    Medical and surgical history reviewed    Prenatal labs reviewed and updated    Physical Exam:  ABD: soft, gravid, nontender    Assessment:  39 weeks, false labbor    Plan:    follow up 1 Weeks, kick counts, labor precautions

## 2017-03-23 PROBLEM — Z37.9 NORMAL LABOR: Status: ACTIVE | Noted: 2017-03-23

## 2017-03-26 ENCOUNTER — PATIENT MESSAGE (OUTPATIENT)
Dept: OBSTETRICS AND GYNECOLOGY | Facility: CLINIC | Age: 27
End: 2017-03-26

## 2017-04-05 DIAGNOSIS — K21.9 GASTROESOPHAGEAL REFLUX DURING PREGNANCY IN FIRST TRIMESTER, ANTEPARTUM: ICD-10-CM

## 2017-04-05 DIAGNOSIS — O99.611 GASTROESOPHAGEAL REFLUX DURING PREGNANCY IN FIRST TRIMESTER, ANTEPARTUM: ICD-10-CM

## 2017-04-06 ENCOUNTER — POSTPARTUM VISIT (OUTPATIENT)
Dept: OBSTETRICS AND GYNECOLOGY | Facility: CLINIC | Age: 27
End: 2017-04-06
Payer: COMMERCIAL

## 2017-04-06 VITALS
WEIGHT: 247.38 LBS | SYSTOLIC BLOOD PRESSURE: 122 MMHG | DIASTOLIC BLOOD PRESSURE: 78 MMHG | BODY MASS INDEX: 41.22 KG/M2 | HEIGHT: 65 IN

## 2017-04-06 PROCEDURE — 0503F POSTPARTUM CARE VISIT: CPT | Mod: S$GLB,,, | Performed by: OBSTETRICS & GYNECOLOGY

## 2017-04-06 PROCEDURE — 99999 PR PBB SHADOW E&M-EST. PATIENT-LVL III: CPT | Mod: PBBFAC,,, | Performed by: OBSTETRICS & GYNECOLOGY

## 2017-04-06 NOTE — PROGRESS NOTES
Patient her for 2 week postpartum exam without complaint. Breast feeding.   She is s/p Spontaneous vaginal Delivery  - girl  She has no mood complaints.   Minimal lochia.   Perineal pain.    Assessment:  2 week interval PP exam, doing well  Healing well    Plan:  Follow up 4 weeks for pelvic and Birth control

## 2017-04-06 NOTE — MR AVS SNAPSHOT
[facility] - OB/GYN  [address] [place] [address]  Phone: [phone]                  Arlette Capps   2017 3:20 PM   Postpartum Visit    Description:  Female : 1990   Provider:  Jack Ramos MD   Department:  [facility] - OB/GYN           Reason for Visit     Postpartum Care                To Do List           Goals (5 Years of Data)     None      [facility]     [facility] Care Line -  Assistance  Unless otherwise directed by your provider, please contact [facility] On[facility], our nurse care line that is available for  assistance.     Registered nurses in the [facility] On Call Center provide: appointment scheduling, clinical advisement, health education, and other advisory services.  Call: [phone] (toll free)               Medications           Message regarding Medications     Verify the changes and/or additions to your medication regime listed below are the same as discussed with your clinician today.  If any of these changes or additions are incorrect, please notify your healthcare provider.             Verify that the below list of medications is an accurate representation of the medications you are currently taking.  If none reported, the list may be blank. If incorrect, please contact your healthcare provider. Carry this list with you in case of emergency.           Current Medications     CALCIUM/MAGNESIUM (CALCIUM AND MAGNESIUM ORAL) Take 2 tablets by mouth 3 (three) times a week.    -iron-FA-om-3s-dha-epa 3.33 mg iron- 0.33 mg Chew Take 1 tablet by mouth once daily.    calcium carbonate 400 mg (1,000 mg) Chew Take 2 tablets by mouth every 2 (two) hours as needed.    oxycodone-acetaminophen (PERCOCET) 5-325 mg per tablet Take 1 tablet by mouth every 4 (four) hours as needed.    ranitidine (ZANTAC) 150 MG tablet TAKE 1 TABLET BY MOUTH TWICE DAILY           Clinical Reference Information           Prenatal Vitals     Enc. Date GA  "Prenatal Vitals Prenatal Pulse Pain Level Urine Albumin/Glucose Edema Presentation Dilation/Effacement/Station    17 39w5d 122/78 / 112.2 kg (247 lb 5.7 oz)           3/23/17 39w5d Admission Dx: Normal labor Dept: [facility]    3/22/17 39w4d 124/70 / 122.2 kg (269 lb 6.4 oz) 40 cm / 134 / Present  2 Negative / Negative  Vertex  / 80 / -3    3/14/17 38w3d 126/84 / 122 kg (268 lb 15.4 oz) 39 cm / 150 / Present  0 Negative / Negative  Vertex  / 50 / -3    3/7/17 37w3d 124/72 / 123 kg (271 lb 2.7 oz) 39 cm / 143 / Present  2 Negative / Negative +1 / +1 Vertex  50 / -3    3/1/17 36w4d 90/60 / 120.5 kg (265 lb 10.5 oz) 39 cm / 144 / Present  0 Negative / Negative       17 35w3d 124/84 / 119.7 kg (263 lb 14.3 oz) 38 cm / 143 / Present  0 Negative / Negative       17 34w3d 119/70 / 117.8 kg (259 lb 11.2 oz) 37 cm / 178 / Present  4 Negative / 1+       17 32w4d 124/74 / 114.8 kg (253 lb 1.4 oz) 32 cm / 142 / Present  2        17 31w5d 124/76 / 114.6 kg (252 lb 10.4 oz) 32 cm / 149 / Present  7 Negative / Negative       17 30w3d 100/70 / 112.4 kg (247 lb 12.8 oz) 30 cm / 145 / Present  7 Negative / Negative       16 27w4d 102/60 / 108.5 kg (239 lb 3.2 oz) 29 cm / 144 / Present 81 0 Negative / Negative       16 24w2d 124/72 / 107.5 kg (236 lb 15.9 oz) 26 cm / 136 / Present  5 Negative       16 23w5d 132/58 (A) / 107 kg (236 lb)  80         16 19w4d 126/76 / 104.6 kg (230 lb 9.6 oz) 23 cm / 158 / Present  2        10/20/16 17w5d 109/60 / 103 kg (227 lb)  94         16 14w5d 118/62 / 101.2 kg (223 lb 1.7 oz) 15 cm / 156  0 Negative / Negative       16 10w5d 100/70 / 101.2 kg (223 lb 1.7 oz)  / 152  0 Negative / Negative       16 7w5d  / 102.5 kg (226 lb)              TW.5 kg (45 lb 2.1 oz)   Pregravid weight: 102 kg (224 lb 13.9 oz)   Number of babies: 1   Height: 5' 5" (1.651 m)   BMI: 37.4       Your Vitals Were     BP Height Weight Last Period BMI    " "122/78 5' 5" (1.651 m) 112.2 kg (247 lb 5.7 oz) 06/18/2016 41.16 kg/m2      Allergies as of 4/6/2017     Latex, Natural Rubber    Protonix [Pantoprazole]      Immunizations Administered on Date of Encounter - 4/6/2017     None      Language Assistance Services     ATTENTION: Language assistance services are available, free of charge. Please call [phone].      ATENCIÓN: Si habla mat, tiene a henry disposición servicios gratuitos de asistencia lingüística. Llame al [phone].     VÍCTOR Ý: N?u b?n nói Ti?ng Vi?t, có các d?ch v? h? tr? ngôn ng? mi?n phí dành cho b?n. G?i s? [phone].         [facility] complies with applicable Federal civil rights laws and does not discriminate on the basis of race, color, national origin, age, disability, or sex.        "

## 2017-04-15 ENCOUNTER — PATIENT MESSAGE (OUTPATIENT)
Dept: OBSTETRICS AND GYNECOLOGY | Facility: CLINIC | Age: 27
End: 2017-04-15

## 2017-05-08 ENCOUNTER — POSTPARTUM VISIT (OUTPATIENT)
Dept: OBSTETRICS AND GYNECOLOGY | Facility: CLINIC | Age: 27
End: 2017-05-08
Payer: COMMERCIAL

## 2017-05-08 VITALS
DIASTOLIC BLOOD PRESSURE: 72 MMHG | SYSTOLIC BLOOD PRESSURE: 124 MMHG | BODY MASS INDEX: 41.07 KG/M2 | HEIGHT: 65 IN | WEIGHT: 246.5 LBS

## 2017-05-08 DIAGNOSIS — K64.4 EXTERNAL HEMORRHOID: Primary | ICD-10-CM

## 2017-05-08 PROCEDURE — 0503F POSTPARTUM CARE VISIT: CPT | Mod: S$GLB,,, | Performed by: OBSTETRICS & GYNECOLOGY

## 2017-05-08 PROCEDURE — 99999 PR PBB SHADOW E&M-EST. PATIENT-LVL III: CPT | Mod: PBBFAC,,, | Performed by: OBSTETRICS & GYNECOLOGY

## 2017-05-08 RX ORDER — HYDROCORTISONE 25 MG/G
CREAM TOPICAL 2 TIMES DAILY
Qty: 28 G | Refills: 1 | Status: SHIPPED | OUTPATIENT
Start: 2017-05-08 | End: 2018-05-17 | Stop reason: ALTCHOICE

## 2017-05-08 NOTE — PROGRESS NOTES
History of Present Illness:   Pateint presents today  6 weeks postop, status post Spontaneous vaginal Delivery  - baby GIRL, with complaint of hemorrhoids.      Past medical and surgical history reviewed.   I have reviewed the patient's medical history in detail and updated the computerized patient record.    Physical exam:  Vital Signs: as above, reviewed.  Constitutional: She is oriented to person, place, and time. She appears well-developed and well-nourished. No distress.   HENT:   Head: Normocephalic and atraumatic.   Eyes: Conjunctivae and EOM are normal. No scleral icterus.   Neck: Normal range of motion. Neck supple. No tracheal deviation present.   Cardiovascular: Normal rate.    Pulmonary/Chest: Effort normal. No respiratory distress. She exhibits no tenderness.  Breasts: deferred, breast feeding  Abdominal: Soft. She exhibits no distension and no mass. There is no rebound and no guarding.   Genitourinary:    External rectal exam shows no thrombosed external hemorrhoids.    Pelvic exam was performed with patient supine.   No labial fusion.   There is no rash, lesion or injury on the right labia.   There is no rash, lesion or injury on the left labia.   No bleeding and no signs of injury around the vaginal introitus, healed well, urethra is without lesions and well supported. The cervix is visualized with no discharge, lesions or friability.   No vaginal discharge found.    No significant Cystocele, Enterocele or rectocele, and uterus well supported.   Bimanual exam:   The urethra is normal to palpation and there are no palpable vaginal wall masses.   Uterus is not deviated, not enlarged, not fixed, normal shape and not tender.   Cervix exhibits no motion tenderness.    Right adnexum displays no mass and no tenderness.   Left adnexum displays no mass and no tenderness.  Musculoskeletal: Normal range of motion.   Lymphadenopathy: No inguinal adenopathy present.   Neurological: She is alert and oriented to  person, place, and time. Coordination normal.   Skin: Skin is warm and dry. She is not diaphoretic.   Psychiatric: She has a normal mood and affect.    Assessment:  Normal pp exam, condoms for Birth control     Plan:  Follow up  3 months  PAP and exam at follow up

## 2017-05-08 NOTE — MR AVS SNAPSHOT
[facility]  [address], [address]  [facility] [address]  Phone: [phone]  Fax: [phone]                  Arlette Capps   2017 10:40 AM   Postpartum Visit    Description:  Female : 1990   Provider:  Jack Ramos MD   Department:  [facility]           Reason for Visit     Postpartum Care                To Do List           Goals (5 Years of Data)     None      [facility]     [facility] On Call Nurse Care Line -  Assistance  Unless otherwise directed by your provider, please contact [facility], our nurse care line that is available for  assistance.     Registered nurses in the [facility] Center provide: appointment scheduling, clinical advisement, health education, and other advisory services.  Call: [phone] (toll free)               Medications           Message regarding Medications     Verify the changes and/or additions to your medication regime listed below are the same as discussed with your clinician today.  If any of these changes or additions are incorrect, please notify your healthcare provider.             Verify that the below list of medications is an accurate representation of the medications you are currently taking.  If none reported, the list may be blank. If incorrect, please contact your healthcare provider. Carry this list with you in case of emergency.           Current Medications     -iron-FA-om-3s-dha-epa 3.33 mg iron- 0.33 mg Chew Take 1 tablet by mouth once daily.    calcium carbonate 400 mg (1,000 mg) Chew Take 2 tablets by mouth every 2 (two) hours as needed.    CALCIUM/MAGNESIUM (CALCIUM AND MAGNESIUM ORAL) Take 2 tablets by mouth 3 (three) times a week.    oxycodone-acetaminophen (PERCOCET) 5-325 mg per tablet Take 1 tablet by mouth every 4 (four) hours as needed.    ranitidine (ZANTAC) 150 MG tablet TAKE 1 TABLET BY MOUTH TWICE DAILY           Clinical Reference  "Information           Your Vitals Were     BP Height Weight BMI       124/72 5' 5" (1.651 m) 111.8 kg (246 lb 7.6 oz) 41.02 kg/m2       Allergies as of 5/8/2017     Latex, Natural Rubber    Protonix [Pantoprazole]      Immunizations Administered on Date of Encounter - 5/8/2017     None      Language Assistance Services     ATTENTION: Language assistance services are available, free of charge. Please call [phone].      ATENCIÓN: Si habla español, tiene a henry disposición servicios gratuitos de asistencia lingüística. Llame al [phone].     VÍCTOR Ý: N?u b?n nói Ti?ng Vi?t, có các d?ch v? h? tr? ngôn ng? mi?n phí dành cho b?n. G?i s? [phone].         [facility] complies with applicable Federal civil rights laws and does not discriminate on the basis of race, color, national origin, age, disability, or sex.        "

## 2017-05-30 RX ORDER — PNV 112/IRON/FOLIC/OM3/DHA/EPA 3.33-.33MG
TABLET,CHEWABLE ORAL
Qty: 90 TABLET | Refills: 0 | Status: SHIPPED | OUTPATIENT
Start: 2017-05-30 | End: 2018-05-17 | Stop reason: ALTCHOICE

## 2017-06-26 PROBLEM — Z37.9 NORMAL LABOR: Status: RESOLVED | Noted: 2017-03-23 | Resolved: 2017-06-26

## 2018-05-17 ENCOUNTER — TELEPHONE (OUTPATIENT)
Dept: FAMILY MEDICINE | Facility: CLINIC | Age: 28
End: 2018-05-17

## 2018-05-17 ENCOUNTER — OFFICE VISIT (OUTPATIENT)
Dept: FAMILY MEDICINE | Facility: CLINIC | Age: 28
End: 2018-05-17
Payer: COMMERCIAL

## 2018-05-17 VITALS
HEART RATE: 87 BPM | SYSTOLIC BLOOD PRESSURE: 103 MMHG | BODY MASS INDEX: 37.82 KG/M2 | HEIGHT: 65 IN | TEMPERATURE: 98 F | WEIGHT: 227 LBS | DIASTOLIC BLOOD PRESSURE: 67 MMHG

## 2018-05-17 DIAGNOSIS — K21.9 GASTROESOPHAGEAL REFLUX DISEASE WITHOUT ESOPHAGITIS: ICD-10-CM

## 2018-05-17 DIAGNOSIS — M79.7 CHRONIC FATIGUE FIBROMYALGIA SYNDROME: ICD-10-CM

## 2018-05-17 DIAGNOSIS — G47.00 INSOMNIA, UNSPECIFIED TYPE: ICD-10-CM

## 2018-05-17 DIAGNOSIS — B00.1 COLD SORE: ICD-10-CM

## 2018-05-17 DIAGNOSIS — G93.32 CHRONIC FATIGUE FIBROMYALGIA SYNDROME: ICD-10-CM

## 2018-05-17 DIAGNOSIS — M79.7 FIBROMYALGIA: ICD-10-CM

## 2018-05-17 DIAGNOSIS — G43.009 MIGRAINE WITHOUT AURA AND WITHOUT STATUS MIGRAINOSUS, NOT INTRACTABLE: ICD-10-CM

## 2018-05-17 DIAGNOSIS — A69.20 LYME DISEASE, UNSPECIFIED: ICD-10-CM

## 2018-05-17 DIAGNOSIS — Z00.00 ANNUAL PHYSICAL EXAM: Primary | ICD-10-CM

## 2018-05-17 PROCEDURE — 99385 PREV VISIT NEW AGE 18-39: CPT | Mod: S$GLB,,, | Performed by: NURSE PRACTITIONER

## 2018-05-17 PROCEDURE — 99999 PR PBB SHADOW E&M-EST. PATIENT-LVL III: CPT | Mod: PBBFAC,,, | Performed by: NURSE PRACTITIONER

## 2018-05-17 RX ORDER — CELECOXIB 200 MG/1
200 CAPSULE ORAL DAILY
Qty: 30 CAPSULE | Refills: 3 | Status: SHIPPED | OUTPATIENT
Start: 2018-05-17 | End: 2018-07-17 | Stop reason: ALTCHOICE

## 2018-05-17 RX ORDER — SUMATRIPTAN 50 MG/1
50 TABLET, FILM COATED ORAL DAILY PRN
Qty: 9 TABLET | Refills: 3 | Status: SHIPPED | OUTPATIENT
Start: 2018-05-17 | End: 2019-02-19 | Stop reason: SDUPTHER

## 2018-05-17 RX ORDER — CYCLOBENZAPRINE HCL 10 MG
10 TABLET ORAL NIGHTLY
Qty: 30 TABLET | Refills: 3 | Status: SHIPPED | OUTPATIENT
Start: 2018-05-17 | End: 2018-07-17 | Stop reason: SDUPTHER

## 2018-05-17 RX ORDER — CELECOXIB 200 MG/1
CAPSULE ORAL
Qty: 90 CAPSULE | Refills: 3 | OUTPATIENT
Start: 2018-05-17

## 2018-05-17 RX ORDER — VALACYCLOVIR HYDROCHLORIDE 1 G/1
1000 TABLET, FILM COATED ORAL 2 TIMES DAILY
Qty: 30 TABLET | Refills: 1 | Status: SHIPPED | OUTPATIENT
Start: 2018-05-17

## 2018-05-17 NOTE — PROGRESS NOTES
Subjective:       Patient ID: Arlette White is a 27 y.o. female.    Chief Complaint: Annual Exam    HPI   Chief Complaint  Chief Complaint   Patient presents with    Annual Exam       HPI  Arlette White is a 27 y.o. female with medical diagnoses as listed in the medical history and problem list that presents for an annual exam.  Patient was seen in the clinic previously on 8/15/16 for a school physical.  BMI today is 37.77 and pateint has gained 1 pound since last visit. Patient is regularly treated for fibromyalgia.  She has a history of positive Lyme disease serology while living in [place].     PAST MEDICAL HISTORY:  Past Medical History:   Diagnosis Date    Allergy     Brain injuries 1990    surgery for blood clot from fall    Fibromyalgia 2013    Nerve damage     hands and arms    Positive Lyme disease serology 07/2016    under care of Dr. RICHARD Angulo in [place]       PAST SURGICAL HISTORY:  History reviewed. No pertinent surgical history.    SOCIAL HISTORY:  Social History     Social History    Marital status:      Spouse name: N/A    Number of children: N/A    Years of education: N/A     Occupational History    Not on file.     Social History Main Topics    Smoking status: Former Smoker     Packs/day: 1.00     Years: 7.00     Types: Cigarettes     Quit date: 12/2/2014    Smokeless tobacco: Never Used      Comment: pt says she is considering a new stop smoking progam at work    Alcohol use 0.6 oz/week     1 Cans of beer per week    Drug use: No    Sexual activity: Yes     Partners: Male     Birth control/ protection: None     Other Topics Concern    Not on file     Social History Narrative    No narrative on file       FAMILY HISTORY:  Family History   Problem Relation Age of Onset    Depression Mother     Kidney disease Mother     Heart disease Mother     Diabetes Father     Heart disease Father     Depression Father     Hyperlipidemia Father     Alcohol abuse  Sister     Drug abuse Sister     Depression Sister     Mental illness Sister     Alcohol abuse Brother     Drug abuse Brother     Depression Brother     Mental illness Brother     Cancer Maternal Grandmother     Heart disease Maternal Grandfather     Kidney disease Maternal Grandfather     Cancer Paternal Grandmother     Diabetes Paternal Grandfather        ALLERGIES AND MEDICATIONS: updated and reviewed.  Review of patient's allergies indicates:   Allergen Reactions    Latex, natural rubber Rash    Protonix [pantoprazole] Swelling     Current Outpatient Prescriptions   Medication Sig Dispense Refill    calcium carbonate 400 mg (1,000 mg) Chew Take 2 tablets by mouth every 2 (two) hours as needed.      CALCIUM/MAGNESIUM (CALCIUM AND MAGNESIUM ORAL) Take 2 tablets by mouth 3 (three) times a week.      ranitidine (ZANTAC) 150 MG tablet TAKE 1 TABLET BY MOUTH TWICE DAILY 60 tablet 0    celecoxib (CELEBREX) 200 MG capsule Take 1 capsule (200 mg total) by mouth once daily. Anti-inflammatory for fibromyalgia 30 capsule 3    cyclobenzaprine (FLEXERIL) 10 MG tablet Take 1 tablet (10 mg total) by mouth every evening. Muscle relaxant for fibromyalgia 30 tablet 3    sumatriptan (IMITREX) 50 MG tablet Take 1 tablet (50 mg total) by mouth daily as needed for Migraine. Migraine, May repeat in 2 hours prn, no more than 2 tabs in 24 hours 9 tablet 3    valACYclovir (VALTREX) 1000 MG tablet Take 1 tablet (1,000 mg total) by mouth 2 (two) times daily. As needed cold sore 30 tablet 1     No current facility-administered medications for this visit.    I have reviewed the patient's medical history in detail and updated the computerized patient record.    Review of Systems   Constitutional: Positive for fatigue. Negative for activity change, appetite change, chills, fever and unexpected weight change.        Fatigued during the day, states has chronic fatigue syndrome. Exercises 3 days per week at gym.    HENT:  "Positive for congestion. Negative for ear pain, hearing loss, rhinorrhea and trouble swallowing.         Nasal congestion due to sinus allergies   Eyes: Negative for discharge and visual disturbance.   Respiratory: Negative for cough, chest tightness, shortness of breath and wheezing.    Cardiovascular: Positive for chest pain. Negative for palpitations.        Every so often feels chest clenching, accompanied by air hunger, pain is in center of chest, mid-sternal, no radiation, not related to activity   Gastrointestinal: Negative for abdominal pain, blood in stool, constipation, diarrhea, nausea and vomiting.        No symptoms of heartburn or GERD with zantac   Endocrine: Positive for polydipsia. Negative for polyuria.        Wakes at night to drink water   Genitourinary: Positive for menstrual problem. Negative for difficulty urinating, dysuria and hematuria.        States recent periods are heavier, a lot of cramping before and during period, LMP 5/10/18   Musculoskeletal: Positive for arthralgias, joint swelling and neck pain.        Multiple joint pains due to fibromyalgia and lyme disease   Skin: Negative for rash and wound.   Neurological: Positive for weakness and headaches. Negative for dizziness and numbness.        Migraine headaches 2 times per week, Accompanied by nausea, photophobia, and phonophobia, room spinning, pain shoots into the eye.  Has tried tylenol and ibuprofen without effect.    Psychiatric/Behavioral: Positive for confusion and sleep disturbance. Negative for dysphoric mood. The patient is not nervous/anxious.         Mental fogginess due to fibromyalgia, does not sleep well at night, difficulty falling and staying asleep.        Objective:       Vitals:    05/17/18 0733   BP: 103/67   BP Location: Right arm   Patient Position: Sitting   BP Method: X-Large (Automatic)   Pulse: 87   Temp: 98.2 °F (36.8 °C)   TempSrc: Oral   Weight: 103 kg (227 lb)   Height: 5' 5" (1.651 m)     Physical " Exam   Constitutional: She is oriented to person, place, and time. Vital signs are normal. She appears well-developed. No distress.   HENT:   Head: Normocephalic and atraumatic.   Right Ear: Tympanic membrane, external ear and ear canal normal.   Left Ear: Tympanic membrane, external ear and ear canal normal.   Nose: Nose normal.   Mouth/Throat: Oropharynx is clear and moist and mucous membranes are normal. No oropharyngeal exudate.   Eyes: Conjunctivae and lids are normal. Pupils are equal, round, and reactive to light. Right eye exhibits no discharge. Left eye exhibits no discharge. Right conjunctiva is not injected. Left conjunctiva is not injected.   Neck: Normal range of motion. Neck supple. Normal carotid pulses present. Carotid bruit is not present. No thyromegaly present.   Cardiovascular: Normal rate, regular rhythm, S1 normal, S2 normal, normal heart sounds, intact distal pulses and normal pulses.  Exam reveals no gallop and no friction rub.    No murmur heard.  Pulses:       Carotid pulses are 2+ on the right side, and 2+ on the left side.       Radial pulses are 2+ on the right side, and 2+ on the left side.        Posterior tibial pulses are 2+ on the right side, and 2+ on the left side.   Pulmonary/Chest: Effort normal and breath sounds normal. No respiratory distress. She has no decreased breath sounds. She has no wheezes. She has no rhonchi. She has no rales.   Abdominal: Soft. Normal appearance and bowel sounds are normal. She exhibits no distension, no abdominal bruit, no pulsatile midline mass and no mass. There is no hepatosplenomegaly. There is no tenderness. No hernia.   Musculoskeletal: Normal range of motion. She exhibits no edema, tenderness or deformity.   Lymphadenopathy:        Head (right side): No submental, no submandibular, no tonsillar, no preauricular, no posterior auricular and no occipital adenopathy present.        Head (left side): No submental, no submandibular, no tonsillar,  no preauricular, no posterior auricular and no occipital adenopathy present.     She has no cervical adenopathy.   Neurological: She is alert and oriented to person, place, and time. She has normal strength.   Skin: Skin is warm, dry and intact. Capillary refill takes less than 2 seconds. No rash noted. She is not diaphoretic. No pallor.   Psychiatric: She has a normal mood and affect. Her speech is normal and behavior is normal. Judgment and thought content normal. Her mood appears not anxious. Cognition and memory are normal. She does not exhibit a depressed mood.   Nursing note and vitals reviewed.      Assessment:       1. Annual physical exam    2. Chronic fatigue fibromyalgia syndrome    3. Lyme disease, unspecified    4. Fibromyalgia    5. Insomnia, unspecified type    6. Gastroesophageal reflux disease without esophagitis    7. Migraine without aura and without status migrainosus, not intractable    8. Cold sore    9. BMI 37.0-37.9, adult        Plan:   Arlette was seen today for annual exam.    Diagnoses and all orders for this visit:    Annual physical exam   Discussed healthy diet, regular exercise, necessary labs, age appropriate cancer screening, and routine vaccinations.     Educational handouts provided. Prevention guidelines women age 18-39  Chronic fatigue fibromyalgia syndrome  -     celecoxib (CELEBREX) 200 MG capsule; Take 1 capsule (200 mg total) by mouth once daily. Anti-inflammatory for fibromyalgia    Lyme disease, unspecified   Continue follow up with specialist in [place]  Fibromyalgia  -     celecoxib (CELEBREX) 200 MG capsule; Take 1 capsule (200 mg total) by mouth once daily. Anti-inflammatory for fibromyalgia, new medication  -     cyclobenzaprine (FLEXERIL) 10 MG tablet; Take 1 tablet (10 mg total) by mouth every evening. Muscle relaxant for fibromyalgia, new medication    Insomnia, unspecified type  -     cyclobenzaprine (FLEXERIL) 10 MG tablet; Take 1 tablet (10 mg total) by mouth  every evening. Muscle relaxant for fibromyalgia    Gastroesophageal reflux disease without esophagitis   Continue zantac 150 mg po BID, effective for symptoms    Migraine without aura and without status migrainosus, not intractable  -     sumatriptan (IMITREX) 50 MG tablet; Take 1 tablet (50 mg total) by mouth daily as needed for Migraine. Migraine, May repeat in 2 hours prn, no more than 2 tabs in 24 hours, new medication  - If continues to have migraines more than 4 per month may be candidate for prevention; propranolol, topamax and Botox discussed    Cold sore  -     valACYclovir (VALTREX) 1000 MG tablet; Take 1 tablet (1,000 mg total) by mouth 2 (two) times daily. As needed cold sore    BMI 37.0-37.9, adult   BMI 37.77   Patient has gained 1 pound since visit 8/15/16   Patient reports exercises 3 days per week at gym   Weight loss recommended with well balanced diet and portion controlled meals and continued regular exercise/activity.     Follow-up in about 3 months (around 8/17/2018) for follow up.     Patient readiness: acceptance and barriers:none    During the course of the visit the patient was educated and counseled about the following:     Obesity:   General weight loss/lifestyle modification strategies discussed (elicit support from others; identify saboteurs; non-food rewards, etc).  Informal exercise measures discussed, e.g. taking stairs instead of elevator.  Regular aerobic exercise program discussed.    Goals: Obesity: Reduce calorie intake and BMI    Did patient meet goals/outcomes: No    The following self management tools provided: declined    Patient Instructions (the written plan) was given to the patient/family.     Time spent with patient: 45 minutes    Barriers to medications present (no )    Adverse reactions to current medications (no)    Over the counter medications reviewed (Yes)

## 2018-05-17 NOTE — PATIENT INSTRUCTIONS
Prevention Guidelines, Women Ages 18 to 39  Screening tests and vaccines are an important part of managing your health. Health counseling is essential, too. Below are guidelines for these, for women ages 18 to 39. Talk with your healthcare provider to make sure youre up-to-date on what you need.  Screening Who needs it How often   Alcohol misuse All women in this age group At routine exams   Blood pressure All women in this age group Every 2 years if your blood pressure is less than 120/80 mm Hg; yearly if your systolic blood pressure is 120 to 139 mm Hg, or your diastolic blood pressure reading is 80 to 89 mm Hg   Breast cancer All women in this age group should talk with their healthcare providers about the need for clinical breast exams (CBE)1 Clinical breast exam every 3 years1   Cervical cancer Women ages 21 and older Women between ages 21 and 29 should have a Pap test every 3 years; women between ages 30 and 65 are advised to have a Pap test plus an HPV test every 5 years   Chlamydia Sexually active women ages 24 and younger, and women at increased risk for infection Every 3 years if you're at risk or have symptoms   Depression All women in this age group At routine exams   Diabetes mellitus, type 2 Adults with no symptoms who are overweight or obese and have 1 or more other risk factors for diabetes At least every 3 years   Gonorrhea Sexually active women at increased risk for infection At routine exams   Hepatitis C Anyone at increased risk At routine exams   HIV All women At routine exams   Obesity All women in this age group At routine exams   Syphilis Women at increased risk for infection - talk with your healthcare provider At routine exams   Tuberculosis Women at increased risk for infection - talk with your healthcare provider Ask your healthcare provider   Vision All women in this age group At least 1 complete exam in your 20s, and 2 in your 30s   Vaccine2 Who needs it How often   Chickenpox  (varicella) All women in this age group who have no record of this infection or vaccine 2 doses; the second dose should be given 4 to 8 weeks after the first dose   Hepatitis A Women at increased risk for infection - talk with your healthcare provider 2 doses given at least 6 months apart   Hepatitis B Women at increased risk for infection - talk with your healthcare provider 3 doses over 6 months; second dose should be given 1 month after the first dose; the third dose should be given at least 2 months after the second dose and at least 4 months after the first dose   Haemophilus influenzae Type B (HIB) Women at increased risk for infection - talk with your healthcare provider 1 to 3 doses   Human papillomavirus (HPV) All women in this age group up to age 26 3 doses; the second dose should be given 1 to 2 months after the first dose and the third dose given 6 months after the first dose   Influenza (flu) All women in this age group Once a year   Measles, mumps, rubella (MMR) All women in this age group who have no record of these infections or vaccines 1 or 2 doses   Meningococcal Women at increased risk for infection - talk with your healthcare provider 1 or more doses   Pneumococcal conjugate vaccine (PCV13) and pneumococcal polysaccharide vaccine (PPSV23) Women at increased risk for infection - talk with your healthcare provider PCV13: 1 dose ages 19 to 65 (protects against 13 types of pneumococcal bacteria)  PPSV23: 1 to 2 doses through age 64, or 1 dose at 65 or older (protects against 23 types of pneumococcal bacteria)      Tetanus/diphtheria/pertussis (Td/Tdap) booster All women in this age group Td every 10 years, or a one-time dose of Tdap instead of a Td booster after age 18, then Td every 10 years   Counseling Who needs it How often   BRCA gene mutation testing for breast and ovarian cancer susceptibility Women with increased risk for having gene mutation When your risk is known   Breast cancer and  chemoprevention Women at high risk for breast cancer When your risk is known   Diet and exercise Women who are overweight or obese When diagnosed, and then at routine exams   Domestic violence Women at the age in which they are able to have children At routine exams   Sexually transmitted infection prevention Women who are sexually active At routine exams   Skin cancer Prevention of skin cancer in fair-skinned adults through age 24 At routine exams   Use of tobacco and the health effects it can cause All women in this age group Every visit   1According to the ACS, women ages 20 to 39 years should have a clinical breast exam (CBE) as part of their routine health exam every 3 years. Breast self-exams are an option for women starting in their 20s. But the  USPSTF does not recommend CBE.  2Those who are 18 years old and not up-to-date on their childhood vaccines should get all appropriate catch-up vaccines recommended by the CDC.  Date Last Reviewed: 8/27/2015  © 5911-9761 [employer]. [address] [place] [address], [place] [address]. All rights reserved. This information is not intended as a substitute for professional medical care. Always follow your healthcare professional's instructions.

## 2018-07-17 ENCOUNTER — OFFICE VISIT (OUTPATIENT)
Dept: FAMILY MEDICINE | Facility: CLINIC | Age: 28
End: 2018-07-17
Payer: COMMERCIAL

## 2018-07-17 ENCOUNTER — PATIENT MESSAGE (OUTPATIENT)
Dept: FAMILY MEDICINE | Facility: CLINIC | Age: 28
End: 2018-07-17

## 2018-07-17 VITALS
TEMPERATURE: 99 F | HEIGHT: 65 IN | SYSTOLIC BLOOD PRESSURE: 114 MMHG | WEIGHT: 220 LBS | HEART RATE: 89 BPM | BODY MASS INDEX: 36.65 KG/M2 | DIASTOLIC BLOOD PRESSURE: 71 MMHG

## 2018-07-17 DIAGNOSIS — M79.7 CHRONIC FATIGUE FIBROMYALGIA SYNDROME: ICD-10-CM

## 2018-07-17 DIAGNOSIS — G93.32 CHRONIC FATIGUE FIBROMYALGIA SYNDROME: ICD-10-CM

## 2018-07-17 DIAGNOSIS — M25.50 ARTHRALGIA, UNSPECIFIED JOINT: ICD-10-CM

## 2018-07-17 DIAGNOSIS — M79.10 MYALGIA: ICD-10-CM

## 2018-07-17 DIAGNOSIS — A69.20 LYME DISEASE, UNSPECIFIED: ICD-10-CM

## 2018-07-17 DIAGNOSIS — M79.7 FIBROMYALGIA: Primary | ICD-10-CM

## 2018-07-17 DIAGNOSIS — G47.00 INSOMNIA, UNSPECIFIED TYPE: ICD-10-CM

## 2018-07-17 DIAGNOSIS — F34.1 DYSTHYMIA: ICD-10-CM

## 2018-07-17 PROCEDURE — 96372 THER/PROPH/DIAG INJ SC/IM: CPT | Mod: S$GLB,,, | Performed by: NURSE PRACTITIONER

## 2018-07-17 PROCEDURE — 99214 OFFICE O/P EST MOD 30 MIN: CPT | Mod: 25,S$GLB,, | Performed by: NURSE PRACTITIONER

## 2018-07-17 PROCEDURE — 99999 PR PBB SHADOW E&M-EST. PATIENT-LVL IV: CPT | Mod: PBBFAC,,, | Performed by: NURSE PRACTITIONER

## 2018-07-17 PROCEDURE — 3008F BODY MASS INDEX DOCD: CPT | Mod: CPTII,S$GLB,, | Performed by: NURSE PRACTITIONER

## 2018-07-17 RX ORDER — KETOROLAC TROMETHAMINE 30 MG/ML
30 INJECTION, SOLUTION INTRAMUSCULAR; INTRAVENOUS
Status: COMPLETED | OUTPATIENT
Start: 2018-07-17 | End: 2018-07-17

## 2018-07-17 RX ORDER — TRAMADOL HYDROCHLORIDE 50 MG/1
50 TABLET ORAL EVERY 8 HOURS PRN
Qty: 20 TABLET | Refills: 0 | Status: SHIPPED | OUTPATIENT
Start: 2018-07-17

## 2018-07-17 RX ORDER — CYCLOBENZAPRINE HCL 10 MG
10 TABLET ORAL NIGHTLY
Qty: 30 TABLET | Refills: 3 | Status: SHIPPED | OUTPATIENT
Start: 2018-07-17 | End: 2019-02-19 | Stop reason: SDUPTHER

## 2018-07-17 RX ORDER — ERGOCALCIFEROL 1.25 MG/1
CAPSULE ORAL
Refills: 4 | COMMUNITY
Start: 2018-05-31

## 2018-07-17 RX ORDER — ESCITALOPRAM OXALATE 20 MG/1
20 TABLET ORAL DAILY
Qty: 30 TABLET | Refills: 2 | Status: SHIPPED | OUTPATIENT
Start: 2018-07-17 | End: 2018-08-26 | Stop reason: SDUPTHER

## 2018-07-17 RX ORDER — CELECOXIB 200 MG/1
200 CAPSULE ORAL DAILY
Qty: 30 CAPSULE | Refills: 3
Start: 2018-07-17

## 2018-07-17 RX ORDER — ZOLPIDEM TARTRATE 10 MG/1
10 TABLET ORAL NIGHTLY
Refills: 2 | COMMUNITY
Start: 2018-07-09

## 2018-07-17 RX ADMIN — KETOROLAC TROMETHAMINE 30 MG: 30 INJECTION, SOLUTION INTRAMUSCULAR; INTRAVENOUS at 11:07

## 2018-07-17 NOTE — PROGRESS NOTES
Pt. Identified using  and name.Procedure was explained, pt. Verbalized understanding. Ketorolac 30mg  administered IM in the right upper outer gluteus  using sterile technique, no bleeding noted at injection site.  Pt. tolerated procedure well.

## 2018-07-17 NOTE — PROGRESS NOTES
Subjective:       Patient ID: Arlette White is a 27 y.o. female.    Chief Complaint: Pain (Joint, Back, Neck, and Feet )    HPI   Chief Complaint  Chief Complaint   Patient presents with    Pain     Joint, Back, Neck, and Feet        HPI  Arlette White is a 27 y.o. female with medical diagnoses as listed in the medical history and problem list that presents with c/o pain to multiple joints, back, neck, feet related to fibromyalgia. At last visit celebrex was started for pain. States was taking the medication and it was helping her pain, but she was gaining weight on the medication and she stopped the medication 5 days ago and is now having increased pain. Patient is also renovating a house and is doing more physical work than usual.  Pain is constant and she rates the pain an '8' on 0-10 scale. Patient has a history of positive lyme disease serology and follows up with a specialist in [place].  Patient reports that lyme disease specialist has started her on a T3 compounded medication for thyroid dysfunction and a progesterone compounded medication that she is currently taking. BMI today is 36.61 and patient has lost 7 pounds since last visit.  Established patient with last clinic appointment 5/17/2018.        PAST MEDICAL HISTORY:  Past Medical History:   Diagnosis Date    Allergy     Brain injuries 1990    surgery for blood clot from fall    Fibromyalgia 2013    Nerve damage     hands and arms    Positive Lyme disease serology 07/2016    under care of Dr. RICHARD Angulo in [place]       PAST SURGICAL HISTORY:  History reviewed. No pertinent surgical history.    SOCIAL HISTORY:  Social History     Social History    Marital status:      Spouse name: N/A    Number of children: N/A    Years of education: N/A     Occupational History    Not on file.     Social History Main Topics    Smoking status: Former Smoker     Packs/day: 1.00     Years: 7.00     Types: Cigarettes     Quit date:  12/2/2014    Smokeless tobacco: Never Used      Comment: pt says she is considering a new stop smoking progam at work    Alcohol use 0.6 oz/week     1 Cans of beer per week    Drug use: No    Sexual activity: Yes     Partners: Male     Birth control/ protection: None     Other Topics Concern    Not on file     Social History Narrative    No narrative on file       FAMILY HISTORY:  Family History   Problem Relation Age of Onset    Depression Mother     Kidney disease Mother     Heart disease Mother     Diabetes Father     Heart disease Father     Depression Father     Hyperlipidemia Father     Alcohol abuse Sister     Drug abuse Sister     Depression Sister     Mental illness Sister     Alcohol abuse Brother     Drug abuse Brother     Depression Brother     Mental illness Brother     Cancer Maternal Grandmother     Heart disease Maternal Grandfather     Kidney disease Maternal Grandfather     Cancer Paternal Grandmother     Diabetes Paternal Grandfather        ALLERGIES AND MEDICATIONS: updated and reviewed.  Review of patient's allergies indicates:   Allergen Reactions    Latex, natural rubber Rash    Protonix [pantoprazole] Swelling     Current Outpatient Prescriptions   Medication Sig Dispense Refill    CALCIUM/MAGNESIUM (CALCIUM AND MAGNESIUM ORAL) Take 2 tablets by mouth 3 (three) times a week.      cyclobenzaprine (FLEXERIL) 10 MG tablet Take 1 tablet (10 mg total) by mouth every evening. Muscle relaxant for fibromyalgia 30 tablet 3    ergocalciferol (ERGOCALCIFEROL) 50,000 unit Cap TK ONE C PO TWICE A WEEK  4    ranitidine (ZANTAC) 150 MG tablet TAKE 1 TABLET BY MOUTH TWICE DAILY 60 tablet 0    sumatriptan (IMITREX) 50 MG tablet Take 1 tablet (50 mg total) by mouth daily as needed for Migraine. Migraine, May repeat in 2 hours prn, no more than 2 tabs in 24 hours 9 tablet 3    UNABLE TO FIND T3 compounded medication      UNABLE TO FIND Compounded progesterone       "valACYclovir (VALTREX) 1000 MG tablet Take 1 tablet (1,000 mg total) by mouth 2 (two) times daily. As needed cold sore 30 tablet 1    zolpidem (AMBIEN) 10 mg Tab Take 10 mg by mouth every evening.  2    celecoxib (CELEBREX) 200 MG capsule Take 1 capsule (200 mg total) by mouth once daily. Anti-inflammatory for fibromyalgia 30 capsule 3    escitalopram oxalate (LEXAPRO) 20 MG tablet Take 1 tablet (20 mg total) by mouth once daily. 30 tablet 2    traMADol (ULTRAM) 50 mg tablet Take 1 tablet (50 mg total) by mouth every 8 (eight) hours as needed for Pain (not relieved by celebrex). 20 tablet 0     No current facility-administered medications for this visit.        Review of Systems   Constitutional: Negative for activity change, appetite change, chills, fatigue and fever.   Respiratory: Positive for cough. Negative for shortness of breath.         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   Gastrointestinal: Positive for nausea. Negative for abdominal pain, constipation, diarrhea and vomiting.        Some nausea in the evening   Genitourinary: Negative for difficulty urinating and dysuria.   Musculoskeletal: Positive for arthralgias, back pain and neck pain.        C/O joint pain all over body, neck, back, feet, knees, ankles.    Skin: Negative for rash and wound.   Neurological: Negative for dizziness, numbness and headaches.   Hematological: Does not bruise/bleed easily.   Psychiatric/Behavioral: Negative for dysphoric mood and sleep disturbance. The patient is not nervous/anxious.         Sleeps well with flexeril and ambien       Objective:       Vitals:    07/17/18 1024   BP: 114/71   BP Location: Right arm   Patient Position: Sitting   BP Method: Large (Automatic)   Pulse: 89   Temp: 98.9 °F (37.2 °C)   TempSrc: Oral   Weight: 99.8 kg (220 lb)   Height: 5' 5" (1.651 m)     Physical Exam   Constitutional: She is oriented to person, place, and " time. Vital signs are normal. She appears well-developed. No distress.   HENT:   Head: Normocephalic and atraumatic.   Eyes: Conjunctivae and lids are normal. Right eye exhibits no discharge. Left eye exhibits no discharge. Right conjunctiva is not injected. Left conjunctiva is not injected.   Neck: Normal carotid pulses present. Carotid bruit is not present.   Cardiovascular: Normal rate, regular rhythm, S1 normal, S2 normal, normal heart sounds, intact distal pulses and normal pulses.    No murmur heard.  Pulses:       Carotid pulses are 2+ on the right side, and 2+ on the left side.       Radial pulses are 2+ on the right side, and 2+ on the left side.        Posterior tibial pulses are 2+ on the right side, and 2+ on the left side.   No edema   Pulmonary/Chest: Effort normal and breath sounds normal. No respiratory distress. She has no decreased breath sounds. She has no wheezes. She has no rhonchi. She has no rales.   Musculoskeletal: Normal range of motion.   Patient able to move about in exam room without difficulty, no evidence of pain observed.   Neurological: She is alert and oriented to person, place, and time. She has normal strength.   Skin: Skin is warm, dry and intact. She is not diaphoretic. No pallor.   Psychiatric: She has a normal mood and affect. Her speech is normal and behavior is normal. Judgment and thought content normal. Cognition and memory are normal.   Nursing note and vitals reviewed.      Assessment:       1. Fibromyalgia    2. Chronic fatigue fibromyalgia syndrome    3. Lyme disease, unspecified    4. Myalgia    5. Arthralgia, unspecified joint, multiple joints    6. Insomnia, unspecified type    7. BMI 36.0-36.9,adult        Plan:   Arlette was seen today for pain.  Patient reports on questioning that she had increased the celebrex from 1 capsule daily to 2 capsules daily, it was only prescribed for 200 mg QD.  Side effects of celebrex from [facility] reviewed with patient and both  edema and weight gain were reported  <2%. I have recommended that patient resume the celebrex at one capsule daily, not two. She agrees to retry the celebrex at the prescribed dose.   Discussed use of cymbalta and savella with patient and she states she has tried both medications without effect.  Discussed use of lyrica, but as this does have a side effect of weight gain, will not try at this time.  Patient asked for exycodone and hydrocodone for pain, advised that these opioids are not recommended for the treatment of fibromyalgia and that I will not prescribe them.  I did agree to provide a one time prescription of tramadol 50 mg #20 with no refills to treat current increase in pain due to increased physical work with home renovation. Patient will also receive an injection of toradol 30 mg IM in office today.  Diagnoses and all orders for this visit:    Fibromyalgia  -     celecoxib (CELEBREX) 200 MG capsule; Take 1 capsule (200 mg total) by mouth once daily. Anti-inflammatory for fibromyalgia  - Educational handouts provided. Celebrex  -     cyclobenzaprine (FLEXERIL) 10 MG tablet; Take 1 tablet (10 mg total) by mouth every evening. Muscle relaxant for fibromyalgia    Chronic fatigue fibromyalgia syndrome  -     celecoxib (CELEBREX) 200 MG capsule; Take 1 capsule (200 mg total) by mouth once daily. Anti-inflammatory for fibromyalgia    Lyme disease, unspecified   Continue follow up with specialist in [place]  Myalgia  -     traMADol (ULTRAM) 50 mg tablet; Take 1 tablet (50 mg total) by mouth every 8 (eight) hours as needed for Pain (not relieved by celebrex).  -     ketorolac injection 30 mg; Inject 1 mL (30 mg total) into the muscle one time.    Arthralgia, unspecified joint, multiple joints  -     traMADol (ULTRAM) 50 mg tablet; Take 1 tablet (50 mg total) by mouth every 8 (eight) hours as needed for Pain (not relieved by celebrex).  queried without inappropriate activity.   -     ketorolac injection 30 mg;  Inject 1 mL (30 mg total) into the muscle one time.    Insomnia, unspecified type  -     cyclobenzaprine (FLEXERIL) 10 MG tablet; Take 1 tablet (10 mg total) by mouth every evening. Muscle relaxant for fibromyalgia  - Patient is also taking ambien 10 mg at  as needed prescribed by physician in [place]    BMI 36.0-36.9,adult   BMI is 36.61 today with a 7 pound weight loss since last visit 5/17/18   Patient is following a ketogenic diet   Maintain heathy diet and exercise/active lifestyle for continued weight loss      Follow-up if symptoms worsen or fail to improve.     Patient readiness: acceptance and barriers:none    During the course of the visit the patient was educated and counseled about the following:     Obesity:   General weight loss/lifestyle modification strategies discussed (elicit support from others; identify saboteurs; non-food rewards, etc).  Informal exercise measures discussed, e.g. taking stairs instead of elevator.  Regular aerobic exercise program discussed.  Continue current weight loss plan, ketogenic diet    Goals: Obesity: Reduce calorie intake and BMI    Did patient meet goals/outcomes: Yes    The following self management tools provided: declined    Patient Instructions (the written plan) was given to the patient/family.     Time spent with patient: 30 minutes    Barriers to medications present (no )    Adverse reactions to current medications (no)    Over the counter medications reviewed (Yes)

## 2018-07-17 NOTE — PATIENT INSTRUCTIONS
Plan to restart celebrex 200 mg at one capsule daily.   Celecoxib capsules  What is this medicine?  CELECOXIB (sell a [employer]) is a non-steroidal anti-inflammatory drug (NSAID). This medicine is used to treat arthritis and ankylosing spondylitis. It may be also used for pain or painful monthly periods.  How should I use this medicine?  Take this medicine by mouth with a full glass of water. Follow the directions on the prescription label. Take it with food if it upsets your stomach or if you take 400 mg at one time. Try to not lie down for at least 10 minutes after you take the medicine. Take the medicine at the same time each day. Do not take more medicine than you are told to take. Long-term, continuous use may increase the risk of heart attack or stroke.  A special MedGuide will be given to you by the pharmacist with each prescription and refill. Be sure to read this information carefully each time.  Talk to your pediatrician regarding the use of this medicine in children. Special care may be needed.  What side effects may I notice from receiving this medicine?  Side effects that you should report to your doctor or health care professional as soon as possible:  · allergic reactions like skin rash, itching or hives, swelling of the face, lips, or tongue  · black or bloody stools, blood in the urine or vomit  · blurred vision  · breathing problems  · chest pain  · nausea, vomiting  · problems with balance, talking, walking  · redness, blistering, peeling or loosening of the skin, including inside the mouth  · unexplained weight gain or swelling  · unusually weak or tired  · yellowing of eyes, skin  Side effects that usually do not require medical attention (report to your doctor or health care professional if they continue or are bothersome):  · constipation or diarrhea  · dizziness  · gas or heartburn  · upset stomach  What may interact with this medicine?  Do not take this medicine with any of the following  medications:  · cidofovir  · methotrexate  · other NSAIDs, medicines for pain and inflammation, like ibuprofen or naproxen  · pemetrexed  This medicine may also interact with the following medications:  · alcohol  · aspirin and aspirin-like drugs  · diuretics  · fluconazole  · lithium  · medicines for high blood pressure  · steroid medicines like prednisone or cortisone  · warfarin  What if I miss a dose?  If you miss a dose, take it as soon as you can. If it is almost time for your next dose, take only that dose. Do not take double or extra doses.  Where should I keep my medicine?  Keep out of the reach of children.  Store at room temperature between 15 and 30 degrees C (59 and 86 degrees F). Keep container tightly closed. Throw away any unused medicine after the expiration date.  What should I tell my health care provider before I take this medicine?  They need to know if you have any of these conditions:  · asthma  · coronary artery bypass graft (CABG) surgery within the past 2 weeks  · drink more than 3 alcohol-containing drinks a day  · heart disease or circulation problems like heart failure or leg edema (fluid retention)  · high blood pressure  · kidney disease  · liver disease  · stomach bleeding or ulcers  · an unusual or allergic reaction to celecoxib, sulfa drugs, aspirin, other NSAIDs, other medicines, foods, dyes, or preservatives  · pregnant or trying to get pregnant  · breast-feeding  What should I watch for while using this medicine?  Tell your doctor or health care professional if your pain does not get better. Talk to your doctor before taking another medicine for pain. Do not treat yourself.  This medicine does not prevent heart attack or stroke. In fact, this medicine may increase the chance of a heart attack or stroke. The chance may increase with longer use of this medicine and in people who have heart disease. If you take aspirin to prevent heart attack or stroke, talk with your doctor or  health care professional.  Do not take medicines such as ibuprofen and naproxen with this medicine. Side effects such as stomach upset, nausea, or ulcers may be more likely to occur. Many medicines available without a prescription should not be taken with this medicine.  This medicine can cause ulcers and bleeding in the stomach and intestines at any time during treatment. Ulcers and bleeding can happen without warning symptoms and can cause death.  NOTE:This sheet is a summary. It may not cover all possible information. If you have questions about this medicine, talk to your doctor, pharmacist, or health care provider. Copyright© 2017 [employer]        Celecoxib capsules  What is this medicine?  CELECOXIB (sell a KOX ib) is a non-steroidal anti-inflammatory drug (NSAID). This medicine is used to treat arthritis and ankylosing spondylitis. It may be also used for pain or painful monthly periods.  How should I use this medicine?  Take this medicine by mouth with a full glass of water. Follow the directions on the prescription label. Take it with food if it upsets your stomach or if you take 400 mg at one time. Try to not lie down for at least 10 minutes after you take the medicine. Take the medicine at the same time each day. Do not take more medicine than you are told to take. Long-term, continuous use may increase the risk of heart attack or stroke.  A special MedGuide will be given to you by the pharmacist with each prescription and refill. Be sure to read this information carefully each time.  Talk to your pediatrician regarding the use of this medicine in children. Special care may be needed.  What side effects may I notice from receiving this medicine?  Side effects that you should report to your doctor or health care professional as soon as possible:  · allergic reactions like skin rash, itching or hives, swelling of the face, lips, or tongue  · black or bloody stools, blood in the urine or vomit  · blurred  vision  · breathing problems  · chest pain  · nausea, vomiting  · problems with balance, talking, walking  · redness, blistering, peeling or loosening of the skin, including inside the mouth  · unexplained weight gain or swelling  · unusually weak or tired  · yellowing of eyes, skin  Side effects that usually do not require medical attention (report to your doctor or health care professional if they continue or are bothersome):  · constipation or diarrhea  · dizziness  · gas or heartburn  · upset stomach  What may interact with this medicine?  Do not take this medicine with any of the following medications:  · cidofovir  · methotrexate  · other NSAIDs, medicines for pain and inflammation, like ibuprofen or naproxen  · pemetrexed  This medicine may also interact with the following medications:  · alcohol  · aspirin and aspirin-like drugs  · diuretics  · fluconazole  · lithium  · medicines for high blood pressure  · steroid medicines like prednisone or cortisone  · warfarin  What if I miss a dose?  If you miss a dose, take it as soon as you can. If it is almost time for your next dose, take only that dose. Do not take double or extra doses.  Where should I keep my medicine?  Keep out of the reach of children.  Store at room temperature between 15 and 30 degrees C (59 and 86 degrees F). Keep container tightly closed. Throw away any unused medicine after the expiration date.  What should I tell my health care provider before I take this medicine?  They need to know if you have any of these conditions:  · asthma  · coronary artery bypass graft (CABG) surgery within the past 2 weeks  · drink more than 3 alcohol-containing drinks a day  · heart disease or circulation problems like heart failure or leg edema (fluid retention)  · high blood pressure  · kidney disease  · liver disease  · stomach bleeding or ulcers  · an unusual or allergic reaction to celecoxib, sulfa drugs, aspirin, other NSAIDs, other medicines, foods,  dyes, or preservatives  · pregnant or trying to get pregnant  · breast-feeding  What should I watch for while using this medicine?  Tell your doctor or health care professional if your pain does not get better. Talk to your doctor before taking another medicine for pain. Do not treat yourself.  This medicine does not prevent heart attack or stroke. In fact, this medicine may increase the chance of a heart attack or stroke. The chance may increase with longer use of this medicine and in people who have heart disease. If you take aspirin to prevent heart attack or stroke, talk with your doctor or health care professional.  Do not take medicines such as ibuprofen and naproxen with this medicine. Side effects such as stomach upset, nausea, or ulcers may be more likely to occur. Many medicines available without a prescription should not be taken with this medicine.  This medicine can cause ulcers and bleeding in the stomach and intestines at any time during treatment. Ulcers and bleeding can happen without warning symptoms and can cause death.  NOTE:This sheet is a summary. It may not cover all possible information. If you have questions about this medicine, talk to your doctor, pharmacist, or health care provider. Copyright© 2017 [employer]

## 2018-08-26 DIAGNOSIS — M79.7 FIBROMYALGIA: ICD-10-CM

## 2018-08-26 DIAGNOSIS — F34.1 DYSTHYMIA: ICD-10-CM

## 2018-08-26 DIAGNOSIS — M79.10 MYALGIA: ICD-10-CM

## 2018-08-26 DIAGNOSIS — M25.50 ARTHRALGIA, UNSPECIFIED JOINT: ICD-10-CM

## 2018-08-27 DIAGNOSIS — F34.1 DYSTHYMIA: ICD-10-CM

## 2018-08-27 DIAGNOSIS — M79.7 FIBROMYALGIA: ICD-10-CM

## 2018-08-27 RX ORDER — ESCITALOPRAM OXALATE 20 MG/1
TABLET ORAL
Qty: 90 TABLET | Refills: 0 | OUTPATIENT
Start: 2018-08-27

## 2018-08-27 RX ORDER — ESCITALOPRAM OXALATE 20 MG/1
TABLET ORAL
Qty: 30 TABLET | Refills: 0 | Status: SHIPPED | OUTPATIENT
Start: 2018-08-27 | End: 2018-08-30 | Stop reason: SDUPTHER

## 2018-08-27 RX ORDER — ESCITALOPRAM OXALATE 20 MG/1
TABLET ORAL
Qty: 30 TABLET | Refills: 0 | Status: SHIPPED | OUTPATIENT
Start: 2018-08-27 | End: 2018-08-27 | Stop reason: SDUPTHER

## 2018-08-27 RX ORDER — TRAMADOL HYDROCHLORIDE 50 MG/1
TABLET ORAL
Qty: 20 TABLET | Refills: 0 | OUTPATIENT
Start: 2018-08-27

## 2018-08-30 DIAGNOSIS — M79.7 FIBROMYALGIA: ICD-10-CM

## 2018-08-30 DIAGNOSIS — F34.1 DYSTHYMIA: ICD-10-CM

## 2018-08-30 RX ORDER — ESCITALOPRAM OXALATE 20 MG/1
TABLET ORAL
Qty: 180 TABLET | Refills: 0 | Status: SHIPPED | OUTPATIENT
Start: 2018-08-30

## 2018-08-30 NOTE — TELEPHONE ENCOUNTER
Patient will be travelling for 6 months- asking to have 6 month supply of [facility]apro [facility]

## 2019-02-19 DIAGNOSIS — M79.7 FIBROMYALGIA: Primary | ICD-10-CM

## 2019-02-19 DIAGNOSIS — G43.009 MIGRAINE WITHOUT AURA AND WITHOUT STATUS MIGRAINOSUS, NOT INTRACTABLE: ICD-10-CM

## 2019-02-19 RX ORDER — SUMATRIPTAN 50 MG/1
TABLET, FILM COATED ORAL
Qty: 9 TABLET | Refills: 0 | Status: SHIPPED | OUTPATIENT
Start: 2019-02-19

## 2019-02-19 RX ORDER — CYCLOBENZAPRINE HCL 10 MG
TABLET ORAL
Qty: 30 TABLET | Refills: 0 | Status: SHIPPED | OUTPATIENT
Start: 2019-02-19 | End: 2019-03-29 | Stop reason: SDUPTHER

## 2019-03-29 DIAGNOSIS — M79.7 FIBROMYALGIA: ICD-10-CM

## 2019-03-30 RX ORDER — CYCLOBENZAPRINE HCL 10 MG
TABLET ORAL
Qty: 30 TABLET | Refills: 0 | Status: SHIPPED | OUTPATIENT
Start: 2019-03-30

## 2019-06-30 NOTE — PROGRESS NOTES
Complaints today: none    Visit Vitals    /84    Wt 119.7 kg (263 lb 14.3 oz)    LMP 2016    BMI 43.25 kg/m2       26 y.o., at 35w3d by Estimated Date of Delivery: 3/25/17  Patient Active Problem List   Diagnosis    Left knee pain    Bilateral hand numbness    Chronic fatigue fibromyalgia syndrome    Lyme disease, unspecified    Obesity complicating pregnancy in second trimester     OB History    Para Term  AB SAB TAB Ectopic Multiple Living   1               # Outcome Date GA Lbr Jeffry/2nd Weight Sex Delivery Anes PTL Lv   1 Current                   Dating reviewed    Allergies and problem list reviewed and updated    Medical and surgical history reviewed    Prenatal labs reviewed and updated    Physical Exam:  ABD: soft, gravid, nontender    Assessment:  35 weeks, no complaints    Plan:    follow up 1 Weeks, kick counts, labor precautions   GBS done today      chewing/when eating